# Patient Record
Sex: MALE | Race: WHITE | NOT HISPANIC OR LATINO
[De-identification: names, ages, dates, MRNs, and addresses within clinical notes are randomized per-mention and may not be internally consistent; named-entity substitution may affect disease eponyms.]

---

## 2022-06-02 ENCOUNTER — APPOINTMENT (OUTPATIENT)
Dept: VASCULAR SURGERY | Facility: CLINIC | Age: 72
End: 2022-06-02

## 2022-06-02 DIAGNOSIS — E78.5 HYPERLIPIDEMIA, UNSPECIFIED: ICD-10-CM

## 2022-06-02 DIAGNOSIS — I10 ESSENTIAL (PRIMARY) HYPERTENSION: ICD-10-CM

## 2022-06-02 DIAGNOSIS — I87.8 OTHER SPECIFIED DISORDERS OF VEINS: ICD-10-CM

## 2022-06-02 DIAGNOSIS — I83.92 ASYMPTOMATIC VARICOSE VEINS OF LEFT LOWER EXTREMITY: ICD-10-CM

## 2022-06-02 PROBLEM — Z00.00 ENCOUNTER FOR PREVENTIVE HEALTH EXAMINATION: Status: ACTIVE | Noted: 2022-06-02

## 2022-06-02 PROCEDURE — ZZZZZ: CPT

## 2022-06-03 PROBLEM — I83.92 ASYMPTOMATIC VARICOSE VEINS OF LEFT LOWER EXTREMITY: Status: ACTIVE | Noted: 2022-06-03

## 2022-06-03 PROBLEM — I87.8 VENOUS STASIS: Status: ACTIVE | Noted: 2022-06-03

## 2022-06-06 PROBLEM — E78.5 HLD (HYPERLIPIDEMIA): Status: ACTIVE | Noted: 2022-06-06

## 2022-06-06 PROBLEM — I10 HTN (HYPERTENSION): Status: ACTIVE | Noted: 2022-06-06

## 2022-06-06 RX ORDER — LEVOTHYROXINE SODIUM 0.12 MG/1
125 TABLET ORAL
Refills: 0 | Status: ACTIVE | COMMUNITY

## 2022-06-06 NOTE — PHYSICAL EXAM
[Respiratory Effort] : normal respiratory effort [Ankle Swelling (On Exam)] : present [Ankle Swelling Bilaterally] : bilaterally  [Varicose Veins Of Lower Extremities] : bilaterally [Ankle Swelling On The Left] : moderate [] : of the right leg [Ankle Swelling On The Right] : mild [No Rash or Lesion] : No rash or lesion [Alert] : alert [Oriented to Person] : oriented to person [Oriented to Place] : oriented to place [Oriented to Time] : oriented to time [Calm] : calm [de-identified] : WN/WD [FreeTextEntry1] : Right medial calf with large, multiple bulging varicose veins. Left medial calf with a couple of large varices, not as prominent. Bilateral mild ankle swelling with indentation on skin from socks R>L. R medial ankle with collection of spider veins and some skin hyperpigmentation skin changes. Pictures available and video. Pt also points out the right medial ankle skin feels dry to touch and tight. [de-identified] : FROM

## 2022-06-06 NOTE — ASSESSMENT
[Arterial/Venous Disease] : arterial/venous disease [FreeTextEntry1] : 72 y/o M w/ RLE GSV reflux and large bulging varices with early signs of venous stasis and asymptomatic spider veins. On exam, right medial calf with large, multiple bulging varicose veins. Left medial calf with a couple of large varices, not as prominent. Bilateral mild ankle swelling with indentation on skin from socks R>L. R medial ankle with collection of spider veins and some skin hyperpigmentation skin changes. Reviewed RLE venous duplex from 10/29/21: all veins compressible. No DVT or SVT. GSV reflux positively identified from SPJ to calf. VV measuring up to 4.9mm. I recommend ablation of the incompetent greater saphenous vein. This will greatly alleviate the symptoms on the right side which are the result of venous hypertension. He might need subsequent stab phlebectomy if the varices persist but most often this i s not necessary.  He wants to proceed and we will arrange for it to be done in our office.\par

## 2022-06-06 NOTE — DATA REVIEWED
[FreeTextEntry1] : RLE venous duplex 10/29/21: all veins compressible. No DVT or SVT. GSV reflux positively identified from SPJ to calf. VV measuring up to 4.9mm.

## 2022-06-06 NOTE — ADDENDUM
[FreeTextEntry1] : I, Dr. Zachary Silva, personally performed the evaluation and management (E/M) services for this new patient.  That E/M includes conducting the initial examination, assessing all conditions, and establishing the plan of care.  Today, my ACP, Ashley Gupta NP, was here to observe my evaluation and management services for this patient to be followed going forward.

## 2022-06-06 NOTE — HISTORY OF PRESENT ILLNESS
[FreeTextEntry1] : Verbal consent for telephonic services was given on June 2nd, 2022 by the patient, Juanito Perea. Visit provided using real-time TELEHEALTH services with two-way video and audio platform. \par \par Patient Location: \par - Home\par \par Provider Location: \par - [facility] - Vascular Surgery [address], [address] [facility] [address]\par \par Participants: \par - Dr Zachary Silva\par - NP Ashley Gupta\par - Patient\par \par I have spent 30 minutes with the patient on the telephone.\par \par \par 70 y/o M w/ hx of venous reflux and varicose veins. He was an old pt of mine many years ago. He reports the right leg venous reflux has been present since 1990s as well as the varices. These have gotten larger and more prominent over the years. 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 He decided to proceed with any suggested intervention for the leg/veins now as the leg feels tired and heavy; it has been limiting his activity status. He explains there is no pain however the veins and leg feels uncomfortable. The veins bulge more as the day progresses along with the symptoms. He has occasional itching over the varices. Denies any personal or family hx of DVT/SVT/PE. No wound/ulcers, claudication symptoms, bleeding varicose veins, previous vein procedures. Strong family history of varicose veins from his mother's side of the family. He has not symptoms on the left leg.\par \par FHx:\par Mother- varicose veins\par Father - heart disease\par \par

## 2022-09-21 ENCOUNTER — NON-APPOINTMENT (OUTPATIENT)
Age: 72
End: 2022-09-21

## 2022-09-21 ENCOUNTER — APPOINTMENT (OUTPATIENT)
Dept: VASCULAR SURGERY | Facility: CLINIC | Age: 72
End: 2022-09-21

## 2022-09-21 DIAGNOSIS — M25.473 EFFUSION, UNSPECIFIED ANKLE: ICD-10-CM

## 2022-09-21 DIAGNOSIS — I83.891 VARICOSE VEINS OF RIGHT LOWER EXTREMITY WITH OTHER COMPLICATIONS: ICD-10-CM

## 2022-09-21 DIAGNOSIS — I87.2 VENOUS INSUFFICIENCY (CHRONIC) (PERIPHERAL): ICD-10-CM

## 2022-09-21 PROCEDURE — 36475 ENDOVENOUS RF 1ST VEIN: CPT | Mod: RT

## 2022-09-22 NOTE — ADDENDUM
[FreeTextEntry1] : This note was written by Ashley Gupta NP acting as scribe for Dr.Gary Ricardo MURPHY.\par \par I, Dr. Zachary Silva  have read and attest that all the information, medical decision making and discharge instructions within are true and accurate.

## 2022-09-22 NOTE — PROCEDURE
[FreeTextEntry3] : Surgeon: Zachary Silva MD\par \par Assistant: Ashley Tiwari RVT\par \par Pre-Op Diagnosis:  Incompetent RIGHT Greater Saphenous Vein\par \par Post-Op Diagnosis:  Same\par \par Procedure:  Transcatheter Occlusion of RIGHT Greater Saphenous Vein using a Radio- Frequency Thermal Ablation (VNUS) ClosureFAST Catheter.\par \par Anesthesia: Local \par \par History: Symptomatic varicose veins\par \par Findings:  Complete occlusion of greater saphenous vein at end of procedure.\par \par Procedure: The patient’s leg was prepped and draped.  Under local 1% Lidocaine the greater saphenous vein was punctured below the knee with a micropuncture needle and then the guidewire was inserted into the vein.  This was performed under ultrasound guidance.  The skin was incised with a #11 Blade, a dilator was inserted and then the 7 Fr. Introducer was placed into the greater saphenous vein.  \par \par The fossa ovalis was visualized with the Duplex scan.  The common femoral vein was confirmed to be patent.  Duplex examination of the greater saphenous vein from the calf to the groin was performed.  The greater saphenous and inferior epigastric vein were identified and the VNUS catheter was introduced through the introducer and into the vein up to the fossa ovalis.  It was positioned 3 cm distal to the inferior epigastric vein.\par \par Tumescent solution (400 cc normal saline, 4cc of 8.4% Sodium bicarbonate and 40 cc 1% Lidoaine) was infiltrated subfascially over the vein.  \par \par The VNUS ClosureFAST catheter checked for proper function.  Thermal ablation was begun after the position of the catheter was once again confirmed to be 3 cm distal to the inferior epigastric branch of the greater saphenous vein. The proximal 7 cm was treated twice and then the rest of the vein was treated with single 20 sec cycles. The sheath and catheter were removed. Compression was applied for hemostasis.    \par \par Confirmation of common femoral vein patency and occlusion of the greater saphenous vein was made with duplex ultrasonography.  \par \par The leg was wrapped with gauze and Ace Bandages.  The patient remained on the table until alert and was then comfortable ambulating.           \par \par Patient will return in 2-3 days for repeat ultrasound to make sure GSV is closed and to check for heat induced thrombus.

## 2022-09-23 ENCOUNTER — APPOINTMENT (OUTPATIENT)
Dept: VASCULAR SURGERY | Facility: CLINIC | Age: 72
End: 2022-09-23

## 2022-09-23 PROCEDURE — 93971 EXTREMITY STUDY: CPT

## 2023-04-26 ENCOUNTER — APPOINTMENT (OUTPATIENT)
Dept: VASCULAR SURGERY | Facility: CLINIC | Age: 73
End: 2023-04-26
Payer: MEDICARE

## 2023-04-26 DIAGNOSIS — I83.93 ASYMPTOMATIC VARICOSE VEINS OF BILATERAL LOWER EXTREMITIES: ICD-10-CM

## 2023-04-26 PROCEDURE — 93970 EXTREMITY STUDY: CPT

## 2023-04-26 PROCEDURE — 99213 OFFICE O/P EST LOW 20 MIN: CPT

## 2023-04-27 NOTE — ADDENDUM
[FreeTextEntry1] : I, Dr. Zcahary Silva, personally performed the evaluation and management (E/M) services for this established patient who presents today with (a) new problem(s)/exacerbation of (an) existing condition(s).  That E/M includes conducting the examination, assessing all new/exacerbated conditions, and establishing a new plan of care.  Today, my ACP, Ashley Gupta NP, was here to observe my evaluation and management services for this new problem/exacerbated condition to be followed going forward. \par \par The documentation for this encounter was entered by Elsy Ortega acting as a scribe for Dr.Gary Silva.\par

## 2023-04-27 NOTE — HISTORY OF PRESENT ILLNESS
[FreeTextEntry1] : 71 y/o M w/ hx of venous reflux and symptomatic varicose veins. 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 \par \par He reports improvement of veins since procedure however, he still points out few bulging veins over the knee. He also notes discoloration that developed from the L ankle to calf a few weeks ago. Denies any personal or family hx of DVT/SVT/PE. No wound/ulcers, claudication symptoms, bleeding varicose veins. Strong family history of varicose veins from his mother's side of the family. He also developed new symptoms of swelling in the left leg. He wears compression stockings 15-20 mmHg and notes some improvement. Patient was also started on Amlodipine for BP.\par \par FHx:\par Mother- varicose veins\par Father - heart disease\par \par

## 2023-04-27 NOTE — PROCEDURE
[FreeTextEntry1] : LE Venous US ordered today to venous insufficiency reveals: negative DVT/SVT b/l. RT GSV closed, GSV calf reflux only. LLE GSV reflux in calf, SSV reflux isolated. vv bilaterally

## 2023-04-27 NOTE — ASSESSMENT
[Arterial/Venous Disease] : arterial/venous disease [FreeTextEntry1] : 71 y/o M w/ venous reflux s/p RLE RFA 9/21/22, large bulging varices on both LEs, early signs of venous stasis and asymptomatic spider veins. On exam,  Right medial calf with large, multiple bulging varicose veins. Left medial calf with a couple of large varices. Bilateral mild ankle swelling with indentation on skin from socks. R medial ankle with collection of spider veins and some skin hyperpigmentation skin changes.\par LE Venous duplex reveals negative DVT/SVT b/l. RT GSV closed, GSV calf reflux only. LLE GSV reflux in calf, SSV reflux isolated,. VV bilaterally.\par Reviewed findings with patient and reassured him no deep vein incompetency shown on US. Patient explained he may benefit from low dosage diuretic for swelling of the leg. He should continue to stay active, wear compression stockings daily and keep the skin well moisturized. F/u prn.

## 2023-08-23 ENCOUNTER — APPOINTMENT (OUTPATIENT)
Dept: VASCULAR SURGERY | Facility: CLINIC | Age: 73
End: 2023-08-23
Payer: MEDICARE

## 2023-08-23 VITALS — HEART RATE: 74 BPM | DIASTOLIC BLOOD PRESSURE: 81 MMHG | SYSTOLIC BLOOD PRESSURE: 146 MMHG

## 2023-08-23 PROCEDURE — 93880 EXTRACRANIAL BILAT STUDY: CPT

## 2023-08-23 PROCEDURE — 99213 OFFICE O/P EST LOW 20 MIN: CPT

## 2023-08-24 ENCOUNTER — OUTPATIENT (OUTPATIENT)
Dept: OUTPATIENT SERVICES | Facility: HOSPITAL | Age: 73
LOS: 1 days | End: 2023-08-24

## 2023-08-24 ENCOUNTER — APPOINTMENT (OUTPATIENT)
Dept: CT IMAGING | Facility: CLINIC | Age: 73
End: 2023-08-24
Payer: MEDICARE

## 2023-08-24 PROCEDURE — 70498 CT ANGIOGRAPHY NECK: CPT | Mod: 26

## 2023-08-25 NOTE — CONSULT LETTER
[Dear  ___] : Dear  [unfilled], [FreeTextEntry2] : [address] [place] [address] [FreeTextEntry1] : I saw Mr Juanito Perea in my office today for evaluation of carotid stenosis. 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 He reports recent findings of carotid stenosis on a duplex. Denies any neurological symptoms. He is on a daily aspirin and statin.  Repeat carotid ultrasound was done today in my office showed the right side has greater than 70% stenosis. The left side has less than 50% stenosis. Blood pressure is 146/81, heart rate 74 b/min. No carotid bruits on exam or focal neurological deficits.   The study was somewhat limited due to the amount and type of plaque on the right side. I have ordered a CT angiogram of the neck to further evaluate the stenosis. He should continue taking statin medication along with a daily aspirin. I will reach out to him with the results of the CT scan.   Please see my full EMR note below. [FreeTextEntry3] : Sincerely,   Zachary Silva M.D.  , Surgical Services [facility] Surgeon-in-Chief, [facility] Professor of Surgery, [employer] at [facility]

## 2023-08-25 NOTE — ADDENDUM
[FreeTextEntry1] : I, Dr. Zachary Silva, personally performed the evaluation and management (E/M) services for this established patient who presents today with (a) new problem(s)/exacerbation of (an) existing condition(s).  That E/M includes conducting the examination, assessing all new/exacerbated conditions, and establishing a new plan of care.  Today, my ACP, Ashley Gupta NP, was here to observe my evaluation and management services for this new problem/exacerbated condition to be followed going forward.   The documentation for this encounter was entered by Adelaida Hobbs acting as a scribe for Dr. Zachary Silva.

## 2023-08-25 NOTE — ASSESSMENT
[Arterial/Venous Disease] : arterial/venous disease [Medication Management] : medication management [FreeTextEntry1] : 73 y/o M w/ hx of venous reflux s/p RLE RFA 9/21/22, large bulging varices on both LEs, early signs of venous stasis and asymptomatic spider veins. Now presenting for eval of carotid stenosis, asymptomatic. (R >70%< L <50%).   On exam, no focal neurological deficits. No carotid bruits. /81 HR 74. Strong radial pulses bilaterally.  Carotid duplex showed Carotid duplex ordered to eval stenosis, shows: R ICA shadowing > stenosis, R ICA> 70%: Limited visibility. L ICA < 50%,  Pt to continue daily baby aspirin and statin. Neck CT angiogram ordered to further evaluate the degree of stenosis. After CT scan, will call pt with results and will let him know next steps to follow.

## 2023-08-25 NOTE — PROCEDURE
[FreeTextEntry1] : Carotid duplex ordered to eval stenosis, shows: R ICA >70% stenosis, fibrocalcific plaque with shadowing, limited study; L ICA < 50%

## 2023-08-25 NOTE — PHYSICAL EXAM
[Respiratory Effort] : normal respiratory effort [2+] : left 2+ [No Rash or Lesion] : No rash or lesion [Alert] : alert [Oriented to Person] : oriented to person [Oriented to Place] : oriented to place [Oriented to Time] : oriented to time [Calm] : calm [Varicose Veins Of Lower Extremities] : present [Varicose Veins Of The Left Leg] : of the left leg [Ankle Swelling On The Right] : mild [Right Carotid Bruit] : no bruit heard over the right carotid [Left Carotid Bruit] : no bruit heard over the left carotid [de-identified] : WN/WD [de-identified] : FROM [FreeTextEntry1] : No focal neuro deficits.

## 2023-08-25 NOTE — HISTORY OF PRESENT ILLNESS
[FreeTextEntry1] : 71 y/o M w/ PMHx of HTN, HLD, who has been seen here in the past for venous reflux and symptomatic varicose veins. 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 Today, he presents for evaluation of carotid stenosis. He is active, cycles regularly. Pt reports palpitation after his dose of levothyroxine was decreased. He presented to the ER, did lab tests and imaging studies and was informed he does not have arrhythmia. He currently feels normal. Denies trouble with walking. Denies any neurological symptoms. He is on a daily aspirin and statin.       FHx: -Mother- hx of varicose veins -Father - hx of heart disease -Grandfather- hx of CVA  SHx: -Never smoker

## 2023-09-07 DIAGNOSIS — Z01.818 ENCOUNTER FOR OTHER PREPROCEDURAL EXAMINATION: ICD-10-CM

## 2023-09-07 RX ORDER — CHROMIUM 200 MCG
TABLET ORAL
Refills: 0 | Status: ACTIVE | COMMUNITY

## 2023-09-14 ENCOUNTER — RESULT REVIEW (OUTPATIENT)
Age: 73
End: 2023-09-14

## 2023-09-14 ENCOUNTER — OUTPATIENT (OUTPATIENT)
Dept: OUTPATIENT SERVICES | Facility: HOSPITAL | Age: 73
LOS: 1 days | End: 2023-09-14
Payer: MEDICARE

## 2023-09-14 DIAGNOSIS — Z01.818 ENCOUNTER FOR OTHER PREPROCEDURAL EXAMINATION: ICD-10-CM

## 2023-09-14 DIAGNOSIS — I10 ESSENTIAL (PRIMARY) HYPERTENSION: ICD-10-CM

## 2023-09-14 DIAGNOSIS — I65.23 OCCLUSION AND STENOSIS OF BILATERAL CAROTID ARTERIES: ICD-10-CM

## 2023-09-14 PROCEDURE — 93018 CV STRESS TEST I&R ONLY: CPT

## 2023-09-14 PROCEDURE — 78452 HT MUSCLE IMAGE SPECT MULT: CPT | Mod: 26

## 2023-09-14 PROCEDURE — A9500: CPT

## 2023-09-14 PROCEDURE — 78452 HT MUSCLE IMAGE SPECT MULT: CPT

## 2023-09-14 PROCEDURE — 93017 CV STRESS TEST TRACING ONLY: CPT

## 2023-09-14 PROCEDURE — 93016 CV STRESS TEST SUPVJ ONLY: CPT

## 2023-09-15 VITALS
HEIGHT: 69 IN | WEIGHT: 212.31 LBS | DIASTOLIC BLOOD PRESSURE: 69 MMHG | TEMPERATURE: 97 F | SYSTOLIC BLOOD PRESSURE: 124 MMHG | OXYGEN SATURATION: 95 % | RESPIRATION RATE: 16 BRPM | HEART RATE: 65 BPM

## 2023-09-17 ENCOUNTER — TRANSCRIPTION ENCOUNTER (OUTPATIENT)
Age: 73
End: 2023-09-17

## 2023-09-18 ENCOUNTER — APPOINTMENT (OUTPATIENT)
Dept: VASCULAR SURGERY | Facility: HOSPITAL | Age: 73
End: 2023-09-18

## 2023-09-18 ENCOUNTER — RESULT REVIEW (OUTPATIENT)
Age: 73
End: 2023-09-18

## 2023-09-18 ENCOUNTER — INPATIENT (INPATIENT)
Facility: HOSPITAL | Age: 73
LOS: 0 days | Discharge: HOME | DRG: 39 | End: 2023-09-19
Attending: SURGERY | Admitting: SURGERY
Payer: MEDICARE

## 2023-09-18 ENCOUNTER — TRANSCRIPTION ENCOUNTER (OUTPATIENT)
Age: 73
End: 2023-09-18

## 2023-09-18 DIAGNOSIS — Z90.89 ACQUIRED ABSENCE OF OTHER ORGANS: Chronic | ICD-10-CM

## 2023-09-18 DIAGNOSIS — Z98.890 OTHER SPECIFIED POSTPROCEDURAL STATES: Chronic | ICD-10-CM

## 2023-09-18 LAB
ANION GAP SERPL CALC-SCNC: 7 MMOL/L — SIGNIFICANT CHANGE UP (ref 5–17)
APTT BLD: 42.5 SEC — HIGH (ref 24.5–35.6)
BUN SERPL-MCNC: 12 MG/DL — SIGNIFICANT CHANGE UP (ref 7–23)
CALCIUM SERPL-MCNC: 8.5 MG/DL — SIGNIFICANT CHANGE UP (ref 8.4–10.5)
CHLORIDE SERPL-SCNC: 108 MMOL/L — SIGNIFICANT CHANGE UP (ref 96–108)
CO2 SERPL-SCNC: 23 MMOL/L — SIGNIFICANT CHANGE UP (ref 22–31)
CREAT SERPL-MCNC: 0.67 MG/DL — SIGNIFICANT CHANGE UP (ref 0.5–1.3)
EGFR: 99 ML/MIN/1.73M2 — SIGNIFICANT CHANGE UP
GLUCOSE BLDC GLUCOMTR-MCNC: 111 MG/DL — HIGH (ref 70–99)
GLUCOSE BLDC GLUCOMTR-MCNC: 134 MG/DL — HIGH (ref 70–99)
GLUCOSE SERPL-MCNC: 90 MG/DL — SIGNIFICANT CHANGE UP (ref 70–99)
HCT VFR BLD CALC: 39.7 % — SIGNIFICANT CHANGE UP (ref 39–50)
HGB BLD-MCNC: 13.7 G/DL — SIGNIFICANT CHANGE UP (ref 13–17)
INR BLD: 1.27 — HIGH (ref 0.85–1.18)
ISTAT ACTK (ACTIVATED CLOTTING TIME KAOLIN): 191 SEC — HIGH (ref 74–137)
MAGNESIUM SERPL-MCNC: 1.9 MG/DL — SIGNIFICANT CHANGE UP (ref 1.6–2.6)
MCHC RBC-ENTMCNC: 31.4 PG — SIGNIFICANT CHANGE UP (ref 27–34)
MCHC RBC-ENTMCNC: 34.5 GM/DL — SIGNIFICANT CHANGE UP (ref 32–36)
MCV RBC AUTO: 91.1 FL — SIGNIFICANT CHANGE UP (ref 80–100)
NRBC # BLD: 0 /100 WBCS — SIGNIFICANT CHANGE UP (ref 0–0)
PHOSPHATE SERPL-MCNC: 3.4 MG/DL — SIGNIFICANT CHANGE UP (ref 2.5–4.5)
PLATELET # BLD AUTO: 201 K/UL — SIGNIFICANT CHANGE UP (ref 150–400)
POTASSIUM SERPL-MCNC: 4 MMOL/L — SIGNIFICANT CHANGE UP (ref 3.5–5.3)
POTASSIUM SERPL-SCNC: 4 MMOL/L — SIGNIFICANT CHANGE UP (ref 3.5–5.3)
PROTHROM AB SERPL-ACNC: 14.4 SEC — HIGH (ref 9.5–13)
RBC # BLD: 4.36 M/UL — SIGNIFICANT CHANGE UP (ref 4.2–5.8)
RBC # FLD: 12.4 % — SIGNIFICANT CHANGE UP (ref 10.3–14.5)
SODIUM SERPL-SCNC: 138 MMOL/L — SIGNIFICANT CHANGE UP (ref 135–145)
WBC # BLD: 7.34 K/UL — SIGNIFICANT CHANGE UP (ref 3.8–10.5)
WBC # FLD AUTO: 7.34 K/UL — SIGNIFICANT CHANGE UP (ref 3.8–10.5)

## 2023-09-18 PROCEDURE — 88304 TISSUE EXAM BY PATHOLOGIST: CPT | Mod: 26

## 2023-09-18 PROCEDURE — 88311 DECALCIFY TISSUE: CPT | Mod: 26

## 2023-09-18 PROCEDURE — 99223 1ST HOSP IP/OBS HIGH 75: CPT | Mod: GC

## 2023-09-18 PROCEDURE — 35301 RECHANNELING OF ARTERY: CPT | Mod: GC

## 2023-09-18 DEVICE — SURGICEL FIBRILLAR 4 X 4": Type: IMPLANTABLE DEVICE | Site: RIGHT | Status: FUNCTIONAL

## 2023-09-18 DEVICE — ARISTA 3GR: Type: IMPLANTABLE DEVICE | Site: RIGHT | Status: FUNCTIONAL

## 2023-09-18 DEVICE — PATCH PERICARDIAL PHOTOFIX .8X8CM: Type: IMPLANTABLE DEVICE | Site: RIGHT | Status: FUNCTIONAL

## 2023-09-18 DEVICE — LIGATING CLIPS WECK HORIZON SMALL-WIDE (RED) 24: Type: IMPLANTABLE DEVICE | Site: RIGHT | Status: FUNCTIONAL

## 2023-09-18 DEVICE — LIGATING CLIPS WECK HORIZON SMALL (YELLOW) 24: Type: IMPLANTABLE DEVICE | Site: RIGHT | Status: FUNCTIONAL

## 2023-09-18 DEVICE — LIGATING CLIPS WECK HORIZON MEDIUM (BLUE) 24: Type: IMPLANTABLE DEVICE | Site: RIGHT | Status: FUNCTIONAL

## 2023-09-18 RX ORDER — ASPIRIN/CALCIUM CARB/MAGNESIUM 324 MG
81 TABLET ORAL DAILY
Refills: 0 | Status: DISCONTINUED | OUTPATIENT
Start: 2023-09-19 | End: 2023-09-19

## 2023-09-18 RX ORDER — GLUCAGON INJECTION, SOLUTION 0.5 MG/.1ML
1 INJECTION, SOLUTION SUBCUTANEOUS ONCE
Refills: 0 | Status: DISCONTINUED | OUTPATIENT
Start: 2023-09-18 | End: 2023-09-19

## 2023-09-18 RX ORDER — SODIUM CHLORIDE 9 MG/ML
250 INJECTION, SOLUTION INTRAVENOUS ONCE
Refills: 0 | Status: COMPLETED | OUTPATIENT
Start: 2023-09-18 | End: 2023-09-18

## 2023-09-18 RX ORDER — DEXTROSE 50 % IN WATER 50 %
25 SYRINGE (ML) INTRAVENOUS ONCE
Refills: 0 | Status: DISCONTINUED | OUTPATIENT
Start: 2023-09-18 | End: 2023-09-19

## 2023-09-18 RX ORDER — INSULIN LISPRO 100/ML
VIAL (ML) SUBCUTANEOUS EVERY 6 HOURS
Refills: 0 | Status: DISCONTINUED | OUTPATIENT
Start: 2023-09-18 | End: 2023-09-19

## 2023-09-18 RX ORDER — LEVOTHYROXINE SODIUM 125 MCG
1 TABLET ORAL
Refills: 0 | DISCHARGE

## 2023-09-18 RX ORDER — DEXTROSE 50 % IN WATER 50 %
15 SYRINGE (ML) INTRAVENOUS ONCE
Refills: 0 | Status: DISCONTINUED | OUTPATIENT
Start: 2023-09-18 | End: 2023-09-19

## 2023-09-18 RX ORDER — LEVOTHYROXINE SODIUM 125 MCG
125 TABLET ORAL DAILY
Refills: 0 | Status: DISCONTINUED | OUTPATIENT
Start: 2023-09-19 | End: 2023-09-19

## 2023-09-18 RX ORDER — CEFAZOLIN SODIUM 1 G
2000 VIAL (EA) INJECTION EVERY 8 HOURS
Refills: 0 | Status: COMPLETED | OUTPATIENT
Start: 2023-09-18 | End: 2023-09-18

## 2023-09-18 RX ORDER — ATORVASTATIN CALCIUM 80 MG/1
1 TABLET, FILM COATED ORAL
Refills: 0 | DISCHARGE

## 2023-09-18 RX ORDER — INFLUENZA VIRUS VACCINE 15; 15; 15; 15 UG/.5ML; UG/.5ML; UG/.5ML; UG/.5ML
0.7 SUSPENSION INTRAMUSCULAR ONCE
Refills: 0 | Status: DISCONTINUED | OUTPATIENT
Start: 2023-09-18 | End: 2023-09-19

## 2023-09-18 RX ORDER — ASPIRIN/CALCIUM CARB/MAGNESIUM 324 MG
1 TABLET ORAL
Refills: 0 | DISCHARGE

## 2023-09-18 RX ORDER — SODIUM CHLORIDE 9 MG/ML
1000 INJECTION, SOLUTION INTRAVENOUS
Refills: 0 | Status: DISCONTINUED | OUTPATIENT
Start: 2023-09-18 | End: 2023-09-19

## 2023-09-18 RX ORDER — ATORVASTATIN CALCIUM 80 MG/1
40 TABLET, FILM COATED ORAL AT BEDTIME
Refills: 0 | Status: DISCONTINUED | OUTPATIENT
Start: 2023-09-18 | End: 2023-09-19

## 2023-09-18 RX ORDER — ACETAMINOPHEN 500 MG
1000 TABLET ORAL ONCE
Refills: 0 | Status: COMPLETED | OUTPATIENT
Start: 2023-09-18 | End: 2023-09-18

## 2023-09-18 RX ORDER — CHLORHEXIDINE GLUCONATE 213 G/1000ML
1 SOLUTION TOPICAL
Refills: 0 | Status: DISCONTINUED | OUTPATIENT
Start: 2023-09-18 | End: 2023-09-19

## 2023-09-18 RX ORDER — SODIUM CHLORIDE 9 MG/ML
1000 INJECTION INTRAMUSCULAR; INTRAVENOUS; SUBCUTANEOUS
Refills: 0 | Status: DISCONTINUED | OUTPATIENT
Start: 2023-09-18 | End: 2023-09-19

## 2023-09-18 RX ORDER — INSULIN LISPRO 100/ML
VIAL (ML) SUBCUTANEOUS AT BEDTIME
Refills: 0 | Status: DISCONTINUED | OUTPATIENT
Start: 2023-09-18 | End: 2023-09-18

## 2023-09-18 RX ORDER — NOREPINEPHRINE BITARTRATE/D5W 8 MG/250ML
0.05 PLASTIC BAG, INJECTION (ML) INTRAVENOUS
Qty: 8 | Refills: 0 | Status: DISCONTINUED | OUTPATIENT
Start: 2023-09-18 | End: 2023-09-19

## 2023-09-18 RX ORDER — DEXTROSE 50 % IN WATER 50 %
12.5 SYRINGE (ML) INTRAVENOUS ONCE
Refills: 0 | Status: DISCONTINUED | OUTPATIENT
Start: 2023-09-18 | End: 2023-09-19

## 2023-09-18 RX ADMIN — CHLORHEXIDINE GLUCONATE 1 APPLICATION(S): 213 SOLUTION TOPICAL at 22:12

## 2023-09-18 RX ADMIN — Medication 1000 MILLIGRAM(S): at 20:05

## 2023-09-18 RX ADMIN — Medication 100 MILLIGRAM(S): at 23:43

## 2023-09-18 RX ADMIN — Medication 100 MILLIGRAM(S): at 16:15

## 2023-09-18 RX ADMIN — ATORVASTATIN CALCIUM 40 MILLIGRAM(S): 80 TABLET, FILM COATED ORAL at 22:09

## 2023-09-18 RX ADMIN — Medication 9.03 MICROGRAM(S)/KG/MIN: at 13:04

## 2023-09-18 RX ADMIN — SODIUM CHLORIDE 100 MILLILITER(S): 9 INJECTION INTRAMUSCULAR; INTRAVENOUS; SUBCUTANEOUS at 13:04

## 2023-09-18 RX ADMIN — Medication 400 MILLIGRAM(S): at 19:50

## 2023-09-18 RX ADMIN — SODIUM CHLORIDE 1000 MILLILITER(S): 9 INJECTION, SOLUTION INTRAVENOUS at 12:24

## 2023-09-18 NOTE — PROVIDER CONTACT NOTE (OTHER) - SITUATION
PT s/ CEA, levophed started for SBP goal 110-160. Pts HR went as low as 37, pt has been 40-55 since admitted to floor.

## 2023-09-18 NOTE — BRIEF OPERATIVE NOTE - NSICDXBRIEFPREOP_GEN_ALL_CORE_FT
PRE-OP DIAGNOSIS:  Carotid artery stenosis, unilateral, right 18-Sep-2023 06:59:37  Jazmine Villatoro

## 2023-09-18 NOTE — BRIEF OPERATIVE NOTE - OPERATION/FINDINGS
Procedure: R CEA  Indication: Asymptomatic Carotid Artery Stenosis  Description: R CEA performed in usual fashion with shunting and patch angioplasty. Hypoglossal and vagus nerves identified and protected. Patient heparinized. Hemostasis achieved and incision closed in layers. Patient awoke with no neurological deficits from anesthesia.  IVF:  EBL: Procedure: R CEA  Indication: Asymptomatic Carotid Artery Stenosis  Description: R CEA performed in usual fashion with patch angioplasty under local and cervical block. Hypoglossal and vagus nerves identified and protected. Patient heparinized. Hemostasis achieved and incision closed in layers. Patient awoke with no neurological deficits from anesthesia.  IVF: 700  EBL: 50  EBL:

## 2023-09-18 NOTE — H&P ADULT - ASSESSMENT
73 y/o M w/ hx HTN, HLD, of venous reflux s/p RLE RFA 9/21/22, large bulging varices on both LEs, early signs of venous stasis and asymptomatic spider veins. Now presenting for eval of carotid stenosis, asymptomatic. (R >70%< L <50%). He has had no focal neurological deficits. Carotid duplex ordered to eval stenosis, shows: R ICA shadowing > stenosis, R ICA> 70% (219/56): Limited visibility. L ICA < 50%, CT showed severe stenosis of proximal R ICA, Moderate stenosis of the right vertebral, and mild stenosis of L ICA. Patient now s/p R CEA    #SHAUN  - s/p CEA  - neurochecks q15 for 1 hr, q30 for 2 hr, q1hr for 5 hrs  - monitor for headaches or neck hematoma  - ancef x 2 doses  - SBP goals 110-160  - NPO for 4 hours then CLD  - IVF @ 75cc  - TOV  - c/w ASA/Statin  - STAT labs    #HTN  - monitor BP, c/w amlodipine    DVT ppx: patient heparinized in OR  Dispo: SICU 71 y/o M w/ hx HTN, hypothyroid, HLD, of venous reflux s/p RLE RFA 9/21/22, large bulging varices on both LEs, early signs of venous stasis and asymptomatic spider veins. Now presenting for eval of carotid stenosis, asymptomatic. (R >70%< L <50%). He has had no focal neurological deficits. Carotid duplex ordered to eval stenosis, shows: R ICA shadowing > stenosis, R ICA> 70% (219/56): Limited visibility. L ICA < 50%, CT showed severe stenosis of proximal R ICA, Moderate stenosis of the right vertebral, and mild stenosis of L ICA. Patient now s/p R CEA    #SHAUN  - s/p CEA  - neurochecks q15 for 1 hr, q30 for 2 hr, q1hr for 5 hrs  - monitor for headaches or neck hematoma  - ancef x 2 doses  - SBP goals 110-160  - NPO for 4 hours then CLD  - IVF @ 75cc  - TOV  - c/w ASA/Statin  - STAT labs    #HTN  - monitor BP, c/w amlodipine    #HLD  - c/w statin    #Hypothyroid  - c/w synthroid    DVT ppx: patient heparinized in OR  Dispo: SICU

## 2023-09-18 NOTE — PROVIDER CONTACT NOTE (OTHER) - ACTION/TREATMENT ORDERED:
PA HO made aware and assessed ot at bedside. PA stated OK and to continue to monitor pt and titrate levophed gtt Down if possible. EKG done, will continue to monitor

## 2023-09-18 NOTE — PROGRESS NOTE ADULT - SUBJECTIVE AND OBJECTIVE BOX
HPI: 72yMale w/ hx of venous reflux s/p RLE RFA 9/21/22, large bulging varices on both LEs, early signs of venous stasis and asymptomatic spider veins. Now presenting for eval of carotid stenosis, asymptomatic. (R >70%< L <50%).  He is asymptomatic, has had no focal neurological deficits.Carotid duplex ordered to eval stenosis, shows: R ICA shadowing > stenosis, R ICA> 70% (219/56): Limited visibility. L ICA < 50%, CT showed severe stenosis of proximal R ICA, Moderate stenosis of the right vertebral, and mild stenosis of L ICA. Patient now presenting for R CEA    PMH:     MEDS:  Allergies    No Known Allergies    Intolerances        ICU Vital Signs Last 24 Hrs  T(F): 97.3 (09-18-23 @ 12:15), Max: 97.3 (09-18-23 @ 12:15)  HR: 46 (09-18-23 @ 15:00) (42 - 65)  BP: 113/57 (09-18-23 @ 15:00) (93/51 - 138/64)  BP(mean): 82 (09-18-23 @ 15:00) (66 - 92)  ABP: 119/43 (09-18-23 @ 15:00)  RR: 19 (09-18-23 @ 15:00) (16 - 19)  SpO2: 95% (09-18-23 @ 15:00) (85% - 99%)    PHYSICAL EXAM:   Neurological: AAOx3, CNII-XII intact,  strength 5/5 b/l  ENT: mucus membrane moist  Cardiovascular: RRR  Respiratory: CTA  Gastrointestinal: soft, NT, ND, BS+  Extremities: warm, no dependent edema  Vascular: no cyanosis/erythema  Skin: no rashes  MSK: no joint swelling.   LABS:    09-18    138  |  108  |  12  ----------------------------<  90  4.0   |  23  |  0.67    Ca    8.5      18 Sep 2023 12:07  Phos  3.4     09-18  Mg     1.9     09-18                          13.7   7.34  )-----------( 201      ( 18 Sep 2023 12:07 )             39.7   PT/INR - ( 18 Sep 2023 12:08 )   PT: 14.4 sec;   INR: 1.27          PTT - ( 18 Sep 2023 12:08 )  PTT:42.5 sec  Urinalysis Basic - ( 18 Sep 2023 12:07 )    Color: x / Appearance: x / SG: x / pH: x  Gluc: 90 mg/dL / Ketone: x  / Bili: x / Urobili: x   Blood: x / Protein: x / Nitrite: x   Leuk Esterase: x / RBC: x / WBC x   Sq Epi: x / Non Sq Epi: x / Bacteria: x    CAPILLARY BLOOD GLUCOSE        Duarte:	  [ ] None	[ ] Daily Duarte Order Placed	   Indication:	  [ ] Strict I and O's    [ ] Obstruction     [ ] Incontinence + Stage 3 or 4 Decubitus  Central Line:  [ ] None	   [ ]  Medication / TPN Administration     [ ] No Peripheral IV          HPI: 72yMale w/ hx of venous reflux s/p RLE RFA 9/21/22, large bulging varices on both LEs, early signs of venous stasis and asymptomatic spider veins. Now presenting for eval of carotid stenosis, asymptomatic. (R >70%< L <50%).  He is asymptomatic, has had no focal neurological deficits.Carotid duplex ordered to eval stenosis, shows: R ICA shadowing > stenosis, R ICA> 70% (219/56): Limited visibility. L ICA < 50%, CT showed severe stenosis of proximal R ICA, Moderate stenosis of the right vertebral, and mild stenosis of L ICA. Patient now presenting for R CEA    PMH: as above.     MEDS: norvasc, lipitor, synthroid 125qd, vit D, ASA 81   Allergies    No Known Allergies    Intolerances        ICU Vital Signs Last 24 Hrs  T(F): 97.3 (09-18-23 @ 12:15), Max: 97.3 (09-18-23 @ 12:15)  HR: 46 (09-18-23 @ 15:00) (42 - 65)  BP: 113/57 (09-18-23 @ 15:00) (93/51 - 138/64)  BP(mean): 82 (09-18-23 @ 15:00) (66 - 92)  ABP: 119/43 (09-18-23 @ 15:00)  RR: 19 (09-18-23 @ 15:00) (16 - 19)  SpO2: 95% (09-18-23 @ 15:00) (85% - 99%)    PHYSICAL EXAM:   Neurological: AAOx3, CNII-XII intact,  strength 5/5 b/l  ENT: mucus membrane moist  Cardiovascular: RRR  Respiratory: CTA  Gastrointestinal: soft, NT, ND, BS+  Extremities: warm, no dependent edema  Vascular: no cyanosis/erythema  Skin: no rashes  MSK: no joint swelling.   LABS:    09-18    138  |  108  |  12  ----------------------------<  90  4.0   |  23  |  0.67    Ca    8.5      18 Sep 2023 12:07  Phos  3.4     09-18  Mg     1.9     09-18                          13.7   7.34  )-----------( 201      ( 18 Sep 2023 12:07 )             39.7   PT/INR - ( 18 Sep 2023 12:08 )   PT: 14.4 sec;   INR: 1.27          PTT - ( 18 Sep 2023 12:08 )  PTT:42.5 sec  Urinalysis Basic - ( 18 Sep 2023 12:07 )    Color: x / Appearance: x / SG: x / pH: x  Gluc: 90 mg/dL / Ketone: x  / Bili: x / Urobili: x   Blood: x / Protein: x / Nitrite: x   Leuk Esterase: x / RBC: x / WBC x   Sq Epi: x / Non Sq Epi: x / Bacteria: x    CAPILLARY BLOOD GLUCOSE        Duarte:	  [ ] None	[ ] Daily Duarte Order Placed	   Indication:	  [ ] Strict I and O's    [ ] Obstruction     [ ] Incontinence + Stage 3 or 4 Decubitus  Central Line:  [ ] None	   [ ]  Medication / TPN Administration     [ ] No Peripheral IV          HPI: 72yMale w/  HTN, HL, hypothyroid, hx of venous reflux s/p RLE RFA 9/21/22, large bulging varices on both LEs, early signs of venous stasis and asymptomatic spider veins. Now presenting for eval of carotid stenosis, asymptomatic. (R >70%< L <50%).  He is asymptomatic, has had no focal neurological deficits.Carotid duplex ordered to eval stenosis, shows: R ICA shadowing > stenosis, R ICA> 70% (219/56): Limited visibility. L ICA < 50%, CT showed severe stenosis of proximal R ICA, Moderate stenosis of the right vertebral, and mild stenosis of L ICA. Patient now presenting for R CEA.   pt transferred to SICU post-op uncomplicated right CEA, EBL 50, got Cryst 700. SBP 90's on arrival to SICU, pt asymptomatic, denies cp/sob/palp, denies headaches, dizziness, weakness. no med c/o.     PMH: as above.     MEDS: norvasc, lipitor, synthroid 125qd, vit D, ASA 81   Allergies    No Known Allergies    Intolerances        ICU Vital Signs Last 24 Hrs  T(F): 97.3 (09-18-23 @ 12:15), Max: 97.3 (09-18-23 @ 12:15)  HR: 46 (09-18-23 @ 15:00) (42 - 65)  BP: 113/57 (09-18-23 @ 15:00) (93/51 - 138/64)  BP(mean): 82 (09-18-23 @ 15:00) (66 - 92)  ABP: 119/43 (09-18-23 @ 15:00)  RR: 19 (09-18-23 @ 15:00) (16 - 19)  SpO2: 95% (09-18-23 @ 15:00) (85% - 99%)    PHYSICAL EXAM:   Neurological: AAOx3, CNII-XII intact,  strength 5/5 b/l,   ENT: mucus membrane moist, right neck dsg clean, no bleeding.   Cardiovascular: RRR  Respiratory: CTA  Gastrointestinal: soft, NT, ND, BS+  Extremities: warm, no dependent edema  Vascular: no cyanosis/erythema  Skin: no rashes  MSK: no joint swelling.     LABS:    09-18    138  |  108  |  12  ----------------------------<  90  4.0   |  23  |  0.67    Ca    8.5      18 Sep 2023 12:07  Phos  3.4     09-18  Mg     1.9     09-18                          13.7   7.34  )-----------( 201      ( 18 Sep 2023 12:07 )             39.7   PT/INR - ( 18 Sep 2023 12:08 )   PT: 14.4 sec;   INR: 1.27          PTT - ( 18 Sep 2023 12:08 )  PTT:42.5 sec  Urinalysis Basic - ( 18 Sep 2023 12:07 )    Color: x / Appearance: x / SG: x / pH: x  Gluc: 90 mg/dL / Ketone: x  / Bili: x / Urobili: x   Blood: x / Protein: x / Nitrite: x   Leuk Esterase: x / RBC: x / WBC x   Sq Epi: x / Non Sq Epi: x / Bacteria: x    CAPILLARY BLOOD GLUCOSE        Duarte:	  [ x] None	[ ] Daily Duarte Order Placed	   Indication:	  [ ] Strict I and O's    [ ] Obstruction     [ ] Incontinence + Stage 3 or 4 Decubitus  Central Line:  [ x] None	   [ ]  Medication / TPN Administration     [ ] No Peripheral IV

## 2023-09-18 NOTE — PROGRESS NOTE ADULT - NS ATTEND AMEND GEN_ALL_CORE FT
HTN, hyperlipidemia, now s/p R CEA with hypotension postoperatively  physical as above  NE to keep SBP over 110, follow relative bradycardia  NS at 100/hr  hold norvasc

## 2023-09-18 NOTE — BRIEF OPERATIVE NOTE - NSICDXBRIEFPOSTOP_GEN_ALL_CORE_FT
POST-OP DIAGNOSIS:  Unilateral carotid artery stenosis, right 18-Sep-2023 06:59:49  Jazmine Villatoro

## 2023-09-18 NOTE — PROGRESS NOTE ADULT - ASSESSMENT
A/P: 72yMale w/ hx HTN, HL, hypothyroid,  venous reflux s/p RLE RFA 9/21/22, large bulging varices on both LEs, early signs of venous stasis and asymptomatic spider veins. found asyptomatic carotid stenosis, right>L, here for elective right CEA    NEURO:  CV:  PULM:   GI/FEN:  :  ENDO: ISS  ID:  PPX: SCDs   LINES: PIVs,   WOUNDS/DRAINS:        A/P: 72yMale w/ hx HTN, HL, hypothyroid,  venous reflux s/p RLE RFA 9/21/22, large bulging varices on both LEs, early signs of venous stasis and asymptomatic spider veins. found asyptomatic carotid stenosis, right>L, here for elective right CEA    NEURO: no focal neuro deficits, tylenol PRN, avoid narcotics. neuro checks q1h,   CV: goal -160, BP slightly lower than goal. given 250cc bolus and started on levophed. mild relative hypotension likely related to manipulation of carotid area from CEA. already took home norvasc this morning pre-op, hold additional norvasc for now given hypotension. cont ASA 81 and lipitor.   PULM: no resp distress on nasal canula.   GI/FEN: adv to CLD. NS@100.   : TOV.   ENDO: ISS, home synthroid.   ID: [email or web address] x 2 more doses post-op. (9/18)   PPX: SCDs, hold SQH today.   LINES: madeline Lang (9/18--)   WOUNDS/DRAINS: right neck incision.   Activity: bedrest today.   DIspo: likely home tomorrow if stable.

## 2023-09-18 NOTE — H&P ADULT - HISTORY OF PRESENT ILLNESS
Attending: Dr. Ricardo SON DEMOND, 72y, Male  MRN:  [id number]      HPI:    73 y/o M w/ hx of venous reflux s/p RLE RFA 9/21/22, large bulging varices on both LEs, early signs of venous stasis and asymptomatic spider veins. Now presenting for eval of carotid stenosis, asymptomatic. (R >70%< L <50%).  He has had no focal neurological deficits.Carotid duplex ordered to eval stenosis, shows: R ICA shadowing > stenosis, R ICA> 70% (219/56): Limited visibility. L ICA < 50%, CT showed severe stenosis of proximal R ICA, Moderate stenosis of the right vertebral, and mild stenosis of L ICA. Patient now presenting for R CEA          PAST MEDICAL & SURGICAL HISTORY:  HTN (hypertension)      HLD (hyperlipidemia)      H/O carotid artery stenosis      History of petit-mal seizures      History of surgery  s/p RLE RFA          Home Medications:  amLODIPine 10 mg oral tablet: 1 orally once a day (15 Sep 2023 09:40)  aspirin 81 mg oral tablet: 1 orally once a day (15 Sep 2023 09:40)  levothyroxine 112 mcg (0.112 mg) oral tablet: 1 orally once a day (15 Sep 2023 09:40)      PHYSICAL EXAM:  General: NAD, resting comfortably  Pulmonary: Nonlabored breathing, no respiratory distress  Cardiovascular: RRR  Abdominal: soft, NT, ND  Extremities: warm and well perfused     LABS:                    CAPILLARY BLOOD GLUCOSE        CAPILLARY BLOOD GLUCOSE            Microbiology:        RADIOLOGY & ADDITIONAL STUDIES:

## 2023-09-19 ENCOUNTER — TRANSCRIPTION ENCOUNTER (OUTPATIENT)
Age: 73
End: 2023-09-19

## 2023-09-19 VITALS
HEART RATE: 55 BPM | DIASTOLIC BLOOD PRESSURE: 58 MMHG | OXYGEN SATURATION: 93 % | RESPIRATION RATE: 23 BRPM | SYSTOLIC BLOOD PRESSURE: 102 MMHG

## 2023-09-19 PROBLEM — E78.5 HYPERLIPIDEMIA, UNSPECIFIED: Chronic | Status: ACTIVE | Noted: 2023-09-15

## 2023-09-19 PROBLEM — Z86.79 PERSONAL HISTORY OF OTHER DISEASES OF THE CIRCULATORY SYSTEM: Chronic | Status: ACTIVE | Noted: 2023-09-15

## 2023-09-19 PROBLEM — I10 ESSENTIAL (PRIMARY) HYPERTENSION: Chronic | Status: ACTIVE | Noted: 2023-09-15

## 2023-09-19 LAB
A1C WITH ESTIMATED AVERAGE GLUCOSE RESULT: 5.6 % — SIGNIFICANT CHANGE UP (ref 4–5.6)
ANION GAP SERPL CALC-SCNC: 7 MMOL/L — SIGNIFICANT CHANGE UP (ref 5–17)
BUN SERPL-MCNC: 10 MG/DL — SIGNIFICANT CHANGE UP (ref 7–23)
CALCIUM SERPL-MCNC: 7.6 MG/DL — LOW (ref 8.4–10.5)
CHLORIDE SERPL-SCNC: 106 MMOL/L — SIGNIFICANT CHANGE UP (ref 96–108)
CO2 SERPL-SCNC: 22 MMOL/L — SIGNIFICANT CHANGE UP (ref 22–31)
CREAT SERPL-MCNC: 0.59 MG/DL — SIGNIFICANT CHANGE UP (ref 0.5–1.3)
EGFR: 102 ML/MIN/1.73M2 — SIGNIFICANT CHANGE UP
ESTIMATED AVERAGE GLUCOSE: 114 MG/DL — SIGNIFICANT CHANGE UP (ref 68–114)
GLUCOSE BLDC GLUCOMTR-MCNC: 110 MG/DL — HIGH (ref 70–99)
GLUCOSE BLDC GLUCOMTR-MCNC: 82 MG/DL — SIGNIFICANT CHANGE UP (ref 70–99)
GLUCOSE SERPL-MCNC: 119 MG/DL — HIGH (ref 70–99)
HCT VFR BLD CALC: 39 % — SIGNIFICANT CHANGE UP (ref 39–50)
HCV AB S/CO SERPL IA: 0.04 S/CO — SIGNIFICANT CHANGE UP
HCV AB SERPL-IMP: SIGNIFICANT CHANGE UP
HGB BLD-MCNC: 13.3 G/DL — SIGNIFICANT CHANGE UP (ref 13–17)
MAGNESIUM SERPL-MCNC: 1.7 MG/DL — SIGNIFICANT CHANGE UP (ref 1.6–2.6)
MCHC RBC-ENTMCNC: 31.4 PG — SIGNIFICANT CHANGE UP (ref 27–34)
MCHC RBC-ENTMCNC: 34.1 GM/DL — SIGNIFICANT CHANGE UP (ref 32–36)
MCV RBC AUTO: 92.2 FL — SIGNIFICANT CHANGE UP (ref 80–100)
NRBC # BLD: 0 /100 WBCS — SIGNIFICANT CHANGE UP (ref 0–0)
PHOSPHATE SERPL-MCNC: 2.9 MG/DL — SIGNIFICANT CHANGE UP (ref 2.5–4.5)
PLATELET # BLD AUTO: 204 K/UL — SIGNIFICANT CHANGE UP (ref 150–400)
POTASSIUM SERPL-MCNC: 4 MMOL/L — SIGNIFICANT CHANGE UP (ref 3.5–5.3)
POTASSIUM SERPL-SCNC: 4 MMOL/L — SIGNIFICANT CHANGE UP (ref 3.5–5.3)
RBC # BLD: 4.23 M/UL — SIGNIFICANT CHANGE UP (ref 4.2–5.8)
RBC # FLD: 12.3 % — SIGNIFICANT CHANGE UP (ref 10.3–14.5)
SODIUM SERPL-SCNC: 135 MMOL/L — SIGNIFICANT CHANGE UP (ref 135–145)
WBC # BLD: 12.04 K/UL — HIGH (ref 3.8–10.5)
WBC # FLD AUTO: 12.04 K/UL — HIGH (ref 3.8–10.5)

## 2023-09-19 PROCEDURE — 86850 RBC ANTIBODY SCREEN: CPT

## 2023-09-19 PROCEDURE — C1889: CPT

## 2023-09-19 PROCEDURE — 82962 GLUCOSE BLOOD TEST: CPT

## 2023-09-19 PROCEDURE — 86900 BLOOD TYPING SEROLOGIC ABO: CPT

## 2023-09-19 PROCEDURE — 83036 HEMOGLOBIN GLYCOSYLATED A1C: CPT

## 2023-09-19 PROCEDURE — 88304 TISSUE EXAM BY PATHOLOGIST: CPT

## 2023-09-19 PROCEDURE — 85347 COAGULATION TIME ACTIVATED: CPT

## 2023-09-19 PROCEDURE — 85730 THROMBOPLASTIN TIME PARTIAL: CPT

## 2023-09-19 PROCEDURE — 85027 COMPLETE CBC AUTOMATED: CPT

## 2023-09-19 PROCEDURE — 86901 BLOOD TYPING SEROLOGIC RH(D): CPT

## 2023-09-19 PROCEDURE — 80048 BASIC METABOLIC PNL TOTAL CA: CPT

## 2023-09-19 PROCEDURE — 83735 ASSAY OF MAGNESIUM: CPT

## 2023-09-19 PROCEDURE — 36415 COLL VENOUS BLD VENIPUNCTURE: CPT

## 2023-09-19 PROCEDURE — 84100 ASSAY OF PHOSPHORUS: CPT

## 2023-09-19 PROCEDURE — 86803 HEPATITIS C AB TEST: CPT

## 2023-09-19 PROCEDURE — 88311 DECALCIFY TISSUE: CPT

## 2023-09-19 PROCEDURE — 85610 PROTHROMBIN TIME: CPT

## 2023-09-19 RX ORDER — MAGNESIUM SULFATE 500 MG/ML
2 VIAL (ML) INJECTION ONCE
Refills: 0 | Status: COMPLETED | OUTPATIENT
Start: 2023-09-19 | End: 2023-09-19

## 2023-09-19 RX ORDER — ACETAMINOPHEN 500 MG
975 TABLET ORAL ONCE
Refills: 0 | Status: COMPLETED | OUTPATIENT
Start: 2023-09-19 | End: 2023-09-19

## 2023-09-19 RX ORDER — AMLODIPINE BESYLATE 2.5 MG/1
1 TABLET ORAL
Qty: 0 | Refills: 0 | DISCHARGE

## 2023-09-19 RX ADMIN — Medication 975 MILLIGRAM(S): at 10:43

## 2023-09-19 RX ADMIN — Medication 25 GRAM(S): at 07:54

## 2023-09-19 RX ADMIN — CHLORHEXIDINE GLUCONATE 1 APPLICATION(S): 213 SOLUTION TOPICAL at 05:55

## 2023-09-19 RX ADMIN — Medication 975 MILLIGRAM(S): at 11:46

## 2023-09-19 RX ADMIN — Medication 125 MICROGRAM(S): at 05:53

## 2023-09-19 RX ADMIN — Medication 81 MILLIGRAM(S): at 11:48

## 2023-09-19 NOTE — PROGRESS NOTE ADULT - SUBJECTIVE AND OBJECTIVE BOX
HPI: 72yMale w/  HTN, HL, hypothyroid, hx of venous reflux s/p RLE RFA 9/21/22, large bulging varices on both LEs, early signs of venous stasis and asymptomatic spider veins. Now presenting for eval of carotid stenosis, asymptomatic. (R >70%< L <50%).  He is asymptomatic, has had no focal neurological deficits.Carotid duplex ordered to eval stenosis, shows: R ICA shadowing > stenosis, R ICA> 70% (219/56): Limited visibility. L ICA < 50%, CT showed severe stenosis of proximal R ICA, Moderate stenosis of the right vertebral, and mild stenosis of L ICA. Patient now presenting for R CEA.   pt transferred to SICU post-op uncomplicated right CEA, EBL 50, got Cryst 700. SBP 90's on arrival to SICU, pt asymptomatic, denies cp/sob/palp, denies headaches, dizziness, weakness. no med c/o.     PMH: as above.     INTERVAL/OVERNIGHT EVENTS: Passed TOV. Received Ofirmev.     SUBJECTIVE: Patient states that he is feeling well post CEA. Patient states that he had right neck pain radiating to the eye and ear overnight that has since resolved. Patient states that he would like to explore alternative pain management options for pain near his CEA incision in addition to acetaminophen.    Medications  norepinephrine Infusion 0.05 MICROgram(s)/kG/Min IV Continuous <Continuous>  dextrose 5%. 1000 milliLiter(s) IV Continuous <Continuous>  dextrose 5%. 1000 milliLiter(s) IV Continuous <Continuous>  magnesium sulfate  IVPB 2 Gram(s) IV Intermittent once  aspirin  chewable 81 milliGRAM(s) Oral daily  influenza  Vaccine (HIGH DOSE) 0.7 milliLiter(s) IntraMuscular once  atorvastatin 40 milliGRAM(s) Oral at bedtime  dextrose 50% Injectable 12.5 Gram(s) IV Push once  dextrose 50% Injectable 25 Gram(s) IV Push once  dextrose 50% Injectable 25 Gram(s) IV Push once  dextrose Oral Gel 15 Gram(s) Oral once PRN Blood Glucose LESS THAN 70 milliGRAM(s)/deciliter  glucagon  Injectable 1 milliGRAM(s) IntraMuscular once  insulin lispro (ADMELOG) corrective regimen sliding scale   SubCutaneous every 6 hours  levothyroxine 125 MICROGram(s) Oral daily  chlorhexidine 2% Cloths 1 Application(s) Topical <User Schedule>  dextrose Oral Gel 15 Gram(s) Oral once PRN Blood Glucose LESS THAN 70 milliGRAM(s)/deciliter      I&O's Detail    18 Sep 2023 07:01  -  19 Sep 2023 07:00  --------------------------------------------------------  IN:    IV PiggyBack: 100 mL    Norepinephrine: 145.6 mL    Oral Fluid: 300 mL    sodium chloride 0.9%: 2000 mL  Total IN: 2545.6 mL    OUT:    Voided (mL): 1230 mL  Total OUT: 1230 mL    Total NET: 1315.6 mL      19 Sep 2023 07:01  -  19 Sep 2023 07:47  --------------------------------------------------------  IN:    Norepinephrine: 5.4 mL  Total IN: 5.4 mL    OUT:  Total OUT: 0 mL    Total NET: 5.4 mL      Vital Signs Last 24 Hrs  T(C): 36.9 (19 Sep 2023 05:01), Max: 36.9 (18 Sep 2023 22:24)  T(F): 98.5 (19 Sep 2023 05:01), Max: 98.5 (19 Sep 2023 05:01)  HR: 50 (19 Sep 2023 07:00) (41 - 65)  BP: 119/55 (19 Sep 2023 07:00) (93/51 - 144/67)  BP(mean): 81 (19 Sep 2023 07:00) (66 - 96)  RR: 21 (19 Sep 2023 07:00) (16 - 26)  SpO2: 96% (19 Sep 2023 07:00) (85% - 99%)    Parameters below as of 19 Sep 2023 08:00  Patient On (Oxygen Delivery Method): nasal cannula  O2 Flow (L/min): 2    PHYSICAL EXAM:   Neurological: AAOx3, strength 5/5 b/l, JOSIAH  ENT: mucus membrane moist, right neck dsg clean, no bleeding.   Cardiovascular: RRR, 2+ pulses, Cap refill <2 seconds  Respiratory: Airway patent. Lung sounds clear bilaterally. No rales/wheezing/rhonchi  Gastrointestinal: soft, NT, ND, BS+  Extremities: warm, no dependent edema. 2+ pulses in all 4 extremities  Vascular: no cyanosis/erythema  Skin: warm, well perfused    LABS:                        13.3   12.04 )-----------( 204      ( 19 Sep 2023 05:07 )             39.0     09-19    135  |  106  |  10  ----------------------------<  119<H>  4.0   |  22  |  0.59    Ca    7.6<L>      19 Sep 2023 05:07  Phos  2.9     09-19  Mg     1.7     09-19    PT/INR - ( 18 Sep 2023 12:08 )   PT: 14.4 sec;   INR: 1.27       PTT - ( 18 Sep 2023 12:08 )  PTT:42.5 sec  Urinalysis Basic - ( 19 Sep 2023 05:07 )    Color: x / Appearance: x / SG: x / pH: x  Gluc: 119 mg/dL / Ketone: x  / Bili: x / Urobili: x   Blood: x / Protein: x / Nitrite: x   Leuk Esterase: x / RBC: x / WBC x   Sq Epi: x / Non Sq Epi: x / Bacteria: x        RADIOLOGY & ADDITIONAL STUDIES:     HPI: 72yMale w/  HTN, HL, hypothyroid, hx of venous reflux s/p RLE RFA 9/21/22, large bulging varices on both LEs, early signs of venous stasis and asymptomatic spider veins. Now presenting for eval of carotid stenosis, asymptomatic. (R >70%< L <50%).  He is asymptomatic, has had no focal neurological deficits.Carotid duplex ordered to eval stenosis, shows: R ICA shadowing > stenosis, R ICA> 70% (219/56): Limited visibility. L ICA < 50%, CT showed severe stenosis of proximal R ICA, Moderate stenosis of the right vertebral, and mild stenosis of L ICA. Patient now presenting for R CEA.   pt transferred to SICU post-op uncomplicated right CEA, EBL 50, got Cryst 700. SBP 90's on arrival to SICU, pt asymptomatic, denies cp/sob/palp, denies headaches, dizziness, weakness. no med c/o.     PMH: as above.   Allergies: NKA    INTERVAL/OVERNIGHT EVENTS: Passed TOV. Received Ofirmev.     SUBJECTIVE: Patient states that he is feeling well post CEA. Patient states that he had right neck pain radiating to the eye and ear overnight that has since resolved. Patient states that he would like to explore alternative pain management options for pain near his CEA incision in addition to acetaminophen.    Medications  norepinephrine Infusion 0.05 MICROgram(s)/kG/Min IV Continuous <Continuous>  dextrose 5%. 1000 milliLiter(s) IV Continuous <Continuous>  dextrose 5%. 1000 milliLiter(s) IV Continuous <Continuous>  magnesium sulfate  IVPB 2 Gram(s) IV Intermittent once  aspirin  chewable 81 milliGRAM(s) Oral daily  influenza  Vaccine (HIGH DOSE) 0.7 milliLiter(s) IntraMuscular once  atorvastatin 40 milliGRAM(s) Oral at bedtime  dextrose 50% Injectable 12.5 Gram(s) IV Push once  dextrose 50% Injectable 25 Gram(s) IV Push once  dextrose 50% Injectable 25 Gram(s) IV Push once  dextrose Oral Gel 15 Gram(s) Oral once PRN Blood Glucose LESS THAN 70 milliGRAM(s)/deciliter  glucagon  Injectable 1 milliGRAM(s) IntraMuscular once  insulin lispro (ADMELOG) corrective regimen sliding scale   SubCutaneous every 6 hours  levothyroxine 125 MICROGram(s) Oral daily  chlorhexidine 2% Cloths 1 Application(s) Topical <User Schedule>  dextrose Oral Gel 15 Gram(s) Oral once PRN Blood Glucose LESS THAN 70 milliGRAM(s)/deciliter      I&O's Detail    18 Sep 2023 07:01  -  19 Sep 2023 07:00  --------------------------------------------------------  IN:    IV PiggyBack: 100 mL    Norepinephrine: 145.6 mL    Oral Fluid: 300 mL    sodium chloride 0.9%: 2000 mL  Total IN: 2545.6 mL    OUT:    Voided (mL): 1230 mL  Total OUT: 1230 mL    Total NET: 1315.6 mL      19 Sep 2023 07:01  -  19 Sep 2023 07:47  --------------------------------------------------------  IN:    Norepinephrine: 5.4 mL  Total IN: 5.4 mL    OUT:  Total OUT: 0 mL    Total NET: 5.4 mL      Vital Signs Last 24 Hrs  T(C): 36.9 (19 Sep 2023 05:01), Max: 36.9 (18 Sep 2023 22:24)  T(F): 98.5 (19 Sep 2023 05:01), Max: 98.5 (19 Sep 2023 05:01)  HR: 50 (19 Sep 2023 07:00) (41 - 65)  BP: 119/55 (19 Sep 2023 07:00) (93/51 - 144/67)  BP(mean): 81 (19 Sep 2023 07:00) (66 - 96)  RR: 21 (19 Sep 2023 07:00) (16 - 26)  SpO2: 96% (19 Sep 2023 07:00) (85% - 99%)    Parameters below as of 19 Sep 2023 08:00  Patient On (Oxygen Delivery Method): nasal cannula  O2 Flow (L/min): 2    PHYSICAL EXAM:   Neurological: AAOx3, strength 5/5 b/l, JOSIAH  ENT: mucus membrane moist, right neck dsg clean, no bleeding.   Cardiovascular: RRR, 2+ pulses, Cap refill <2 seconds  Respiratory: Airway patent. Lung sounds clear bilaterally. No rales/wheezing/rhonchi  Gastrointestinal: soft, NT, ND, BS+  Extremities: warm, no dependent edema. 2+ pulses in all 4 extremities  Vascular: no cyanosis/erythema  Skin: warm, well perfused    LABS:                        13.3   12.04 )-----------( 204      ( 19 Sep 2023 05:07 )             39.0     09-19    135  |  106  |  10  ----------------------------<  119<H>  4.0   |  22  |  0.59    Ca    7.6<L>      19 Sep 2023 05:07  Phos  2.9     09-19  Mg     1.7     09-19    PT/INR - ( 18 Sep 2023 12:08 )   PT: 14.4 sec;   INR: 1.27       PTT - ( 18 Sep 2023 12:08 )  PTT:42.5 sec  Urinalysis Basic - ( 19 Sep 2023 05:07 )    Color: x / Appearance: x / SG: x / pH: x  Gluc: 119 mg/dL / Ketone: x  / Bili: x / Urobili: x   Blood: x / Protein: x / Nitrite: x   Leuk Esterase: x / RBC: x / WBC x   Sq Epi: x / Non Sq Epi: x / Bacteria: x        RADIOLOGY & ADDITIONAL STUDIES:     HPI: 72yMale w/  HTN, HL, hypothyroid, hx of venous reflux s/p RLE RFA 9/21/22, large bulging varices on both LEs, early signs of venous stasis and asymptomatic spider veins. Now presenting for eval of carotid stenosis, asymptomatic. (R >70%< L <50%).  He is asymptomatic, has had no focal neurological deficits.Carotid duplex ordered to eval stenosis, shows: R ICA shadowing > stenosis, R ICA> 70% (219/56): Limited visibility. L ICA < 50%, CT showed severe stenosis of proximal R ICA, Moderate stenosis of the right vertebral, and mild stenosis of L ICA. Patient now presenting for R CEA.   pt transferred to SICU post-op uncomplicated right CEA, EBL 50, got Cryst 700. SBP 90's on arrival to SICU, pt asymptomatic, denies cp/sob/palp, denies headaches, dizziness, weakness. no med c/o.     PMH: as above.   MEDS: Norvasc, lipitor, synthroid 125q5, vit D, ASA 81  Allergies: NKA    INTERVAL/OVERNIGHT EVENTS: Passed TOV. Received Ofirmev.     SUBJECTIVE: Patient states that he is feeling well post CEA. Patient states that he had right neck pain radiating to the eye and ear overnight that has since resolved. Patient states that he would like to explore alternative pain management options for pain near his CEA incision in addition to acetaminophen.    Vital Signs Last 24 Hrs  T(C): 36.9 (19 Sep 2023 05:01), Max: 36.9 (18 Sep 2023 22:24)  T(F): 98.5 (19 Sep 2023 05:01), Max: 98.5 (19 Sep 2023 05:01)  HR: 50 (19 Sep 2023 07:00) (41 - 65)  BP: 119/55 (19 Sep 2023 07:00) (93/51 - 144/67)  BP(mean): 81 (19 Sep 2023 07:00) (66 - 96)  RR: 21 (19 Sep 2023 07:00) (16 - 26)  SpO2: 96% (19 Sep 2023 07:00) (85% - 99%)    Parameters below as of 19 Sep 2023 08:00  Patient On (Oxygen Delivery Method): nasal cannula  O2 Flow (L/min): 2    I&O's Detail    18 Sep 2023 07:01  -  19 Sep 2023 07:00  --------------------------------------------------------  IN:    IV PiggyBack: 100 mL    Norepinephrine: 145.6 mL    Oral Fluid: 300 mL    sodium chloride 0.9%: 2000 mL  Total IN: 2545.6 mL    OUT:    Voided (mL): 1230 mL  Total OUT: 1230 mL    Total NET: 1315.6 mL      19 Sep 2023 07:01  -  19 Sep 2023 07:47  --------------------------------------------------------  IN:    Norepinephrine: 5.4 mL  Total IN: 5.4 mL    OUT:  Total OUT: 0 mL    Total NET: 5.4 mL      PHYSICAL EXAM:   Neurological: AAOx3, strength 5/5 b/l, JOSIAH  ENT: mucus membrane moist, right neck dsg clean, no bleeding.   Cardiovascular: RRR, 2+ pulses, Cap refill <2 seconds  Respiratory: Airway patent. Lung sounds clear bilaterally. No rales/wheezing/rhonchi  Gastrointestinal: soft, NT, ND, BS+  Extremities: warm, no dependent edema. 2+ pulses in all 4 extremities  Vascular: no cyanosis/erythema  Skin: warm, well perfused    LABS:                        13.3   12.04 )-----------( 204      ( 19 Sep 2023 05:07 )             39.0     09-19    135  |  106  |  10  ----------------------------<  119<H>  4.0   |  22  |  0.59    Ca    7.6<L>      19 Sep 2023 05:07  Phos  2.9     09-19  Mg     1.7     09-19    PT/INR - ( 18 Sep 2023 12:08 )   PT: 14.4 sec;   INR: 1.27       PTT - ( 18 Sep 2023 12:08 )  PTT:42.5 sec  Urinalysis Basic - ( 19 Sep 2023 05:07 )    Medications  norepinephrine Infusion 0.05 MICROgram(s)/kG/Min IV Continuous <Continuous>  dextrose 5%. 1000 milliLiter(s) IV Continuous <Continuous>  dextrose 5%. 1000 milliLiter(s) IV Continuous <Continuous>  magnesium sulfate  IVPB 2 Gram(s) IV Intermittent once  aspirin  chewable 81 milliGRAM(s) Oral daily  influenza  Vaccine (HIGH DOSE) 0.7 milliLiter(s) IntraMuscular once  atorvastatin 40 milliGRAM(s) Oral at bedtime  dextrose 50% Injectable 12.5 Gram(s) IV Push once  dextrose 50% Injectable 25 Gram(s) IV Push once  dextrose 50% Injectable 25 Gram(s) IV Push once  dextrose Oral Gel 15 Gram(s) Oral once PRN Blood Glucose LESS THAN 70 milliGRAM(s)/deciliter  glucagon  Injectable 1 milliGRAM(s) IntraMuscular once  insulin lispro (ADMELOG) corrective regimen sliding scale   SubCutaneous every 6 hours  levothyroxine 125 MICROGram(s) Oral daily  chlorhexidine 2% Cloths 1 Application(s) Topical <User Schedule>  dextrose Oral Gel 15 Gram(s) Oral once PRN Blood Glucose LESS THAN 70 milliGRAM(s)/deciliter    Color: x / Appearance: x / SG: x / pH: x  Gluc: 119 mg/dL / Ketone: x  / Bili: x / Urobili: x   Blood: x / Protein: x / Nitrite: x   Leuk Esterase: x / RBC: x / WBC x   Sq Epi: x / Non Sq Epi: x / Bacteria: x        RADIOLOGY & ADDITIONAL STUDIES:     HPI: 72yMale w/  HTN, HL, hypothyroid, hx of venous reflux s/p RLE RFA 9/21/22, large bulging varices on both LEs, early signs of venous stasis and asymptomatic spider veins. Now presenting for eval of carotid stenosis, asymptomatic. (R >70%< L <50%).  He is asymptomatic, has had no focal neurological deficits.Carotid duplex ordered to eval stenosis, shows: R ICA shadowing > stenosis, R ICA> 70% (219/56): Limited visibility. L ICA < 50%, CT showed severe stenosis of proximal R ICA, Moderate stenosis of the right vertebral, and mild stenosis of L ICA. Patient now presenting for R CEA.   pt transferred to SICU post-op uncomplicated right CEA, EBL 50, got Cryst 700. SBP 90's on arrival to SICU, pt asymptomatic, denies cp/sob/palp, denies headaches, dizziness, weakness. no med c/o.   PMH: as above.   MEDS: Norvasc, lipitor, synthroid 125q5, vit D, ASA 81  Allergies: NKA    INTERVAL/OVERNIGHT EVENTS: Passed TOV. Received Ofirmev.     SUBJECTIVE: Patient states that he is feeling well post CEA. Patient states that he had right neck pain radiating to the eye and ear overnight that has since resolved. Patient states that he would like to explore alternative pain management options for pain near his CEA incision in addition to acetaminophen.    Vital Signs Last 24 Hrs  T(C): 36.9 (19 Sep 2023 05:01), Max: 36.9 (18 Sep 2023 22:24)  T(F): 98.5 (19 Sep 2023 05:01), Max: 98.5 (19 Sep 2023 05:01)  HR: 50 (19 Sep 2023 07:00) (41 - 65)  BP: 119/55 (19 Sep 2023 07:00) (93/51 - 144/67)  BP(mean): 81 (19 Sep 2023 07:00) (66 - 96)  RR: 21 (19 Sep 2023 07:00) (16 - 26)  SpO2: 96% (19 Sep 2023 07:00) (85% - 99%)    Parameters below as of 19 Sep 2023 08:00  Patient On (Oxygen Delivery Method): nasal cannula  O2 Flow (L/min): 2    I&O's Detail    18 Sep 2023 07:01  -  19 Sep 2023 07:00  --------------------------------------------------------  IN:    IV PiggyBack: 100 mL    Norepinephrine: 145.6 mL    Oral Fluid: 300 mL    sodium chloride 0.9%: 2000 mL  Total IN: 2545.6 mL    OUT:    Voided (mL): 1230 mL  Total OUT: 1230 mL    Total NET: 1315.6 mL      19 Sep 2023 07:01  -  19 Sep 2023 07:47  --------------------------------------------------------  IN:    Norepinephrine: 5.4 mL  Total IN: 5.4 mL    OUT:  Total OUT: 0 mL    Total NET: 5.4 mL      PHYSICAL EXAM:   Neurological: AAOx3, strength 5/5 b/l, JOSIAH  ENT: mucus membrane moist, right neck dsg clean, no bleeding.   Cardiovascular: RRR, 2+ pulses, Cap refill <2 seconds  Respiratory: Airway patent. Lung sounds clear bilaterally. No rales/wheezing/rhonchi  Gastrointestinal: soft, NT, ND, BS+  Extremities: warm, no dependent edema. 2+ pulses in all 4 extremities  Vascular: no cyanosis/erythema  Skin: warm, well perfused    LABS:                        13.3   12.04 )-----------( 204      ( 19 Sep 2023 05:07 )             39.0     09-19    135  |  106  |  10  ----------------------------<  119<H>  4.0   |  22  |  0.59    Ca    7.6<L>      19 Sep 2023 05:07  Phos  2.9     09-19  Mg     1.7     09-19    PT/INR - ( 18 Sep 2023 12:08 )   PT: 14.4 sec;   INR: 1.27       PTT - ( 18 Sep 2023 12:08 )  PTT:42.5 sec  Urinalysis Basic - ( 19 Sep 2023 05:07 )    Medications  norepinephrine Infusion 0.05 MICROgram(s)/kG/Min IV Continuous <Continuous>  dextrose 5%. 1000 milliLiter(s) IV Continuous <Continuous>  dextrose 5%. 1000 milliLiter(s) IV Continuous <Continuous>  magnesium sulfate  IVPB 2 Gram(s) IV Intermittent once  aspirin  chewable 81 milliGRAM(s) Oral daily  influenza  Vaccine (HIGH DOSE) 0.7 milliLiter(s) IntraMuscular once  atorvastatin 40 milliGRAM(s) Oral at bedtime  dextrose 50% Injectable 12.5 Gram(s) IV Push once  dextrose 50% Injectable 25 Gram(s) IV Push once  dextrose 50% Injectable 25 Gram(s) IV Push once  dextrose Oral Gel 15 Gram(s) Oral once PRN Blood Glucose LESS THAN 70 milliGRAM(s)/deciliter  glucagon  Injectable 1 milliGRAM(s) IntraMuscular once  insulin lispro (ADMELOG) corrective regimen sliding scale   SubCutaneous every 6 hours  levothyroxine 125 MICROGram(s) Oral daily  chlorhexidine 2% Cloths 1 Application(s) Topical <User Schedule>  dextrose Oral Gel 15 Gram(s) Oral once PRN Blood Glucose LESS THAN 70 milliGRAM(s)/deciliter    Color: x / Appearance: x / SG: x / pH: x  Gluc: 119 mg/dL / Ketone: x  / Bili: x / Urobili: x   Blood: x / Protein: x / Nitrite: x   Leuk Esterase: x / RBC: x / WBC x   Sq Epi: x / Non Sq Epi: x / Bacteria: x        RADIOLOGY & ADDITIONAL STUDIES:     HPI: 72yMale w/  HTN, HL, hypothyroid, hx of venous reflux s/p RLE RFA 9/21/22, large bulging varices on both LEs, early signs of venous stasis and asymptomatic spider veins. Now presenting for eval of carotid stenosis, asymptomatic. (R >70%< L <50%).  He is asymptomatic, has had no focal neurological deficits.Carotid duplex ordered to eval stenosis, shows: R ICA shadowing > stenosis, R ICA> 70% (219/56): Limited visibility. L ICA < 50%, CT showed severe stenosis of proximal R ICA, Moderate stenosis of the right vertebral, and mild stenosis of L ICA. Patient now presenting for R CEA.   pt transferred to SICU post-op uncomplicated right CEA, EBL 50, got Cryst 700. SBP 90's on arrival to SICU, pt asymptomatic, denies cp/sob/palp, denies headaches, dizziness, weakness. no med c/o.   PMH: as above.   MEDS: Norvasc, lipitor, synthroid 125q5, vit D, ASA 81  Allergies: NKA    INTERVAL/OVERNIGHT EVENTS: Passed TOV. Received Ofirmev.     SUBJECTIVE: Patient states that he is feeling well post CEA. Patient states that he had right neck pain radiating to the eye and ear overnight that has since resolved. Patient states that he would like to explore alternative pain management options for pain near his CEA incision in addition to acetaminophen.    Vital Signs Last 24 Hrs  T(C): 36.9 (19 Sep 2023 05:01), Max: 36.9 (18 Sep 2023 22:24)  T(F): 98.5 (19 Sep 2023 05:01), Max: 98.5 (19 Sep 2023 05:01)  HR: 54 (19 Sep 2023 08:00) (41 - 54)  BP: 106/51 (19 Sep 2023 08:00) (93/51 - 144/67)  BP(mean): 73 (19 Sep 2023 08:00) (66 - 96)  RR: 21 (19 Sep 2023 08:00) (17 - 26)  SpO2: 93% (19 Sep 2023 08:00) (85% - 99%)    Parameters below as of 19 Sep 2023 08:00  Patient On (Oxygen Delivery Method): nasal cannula  O2 Flow (L/min): 2    I&O's Detail  18 Sep 2023 07:01  -  19 Sep 2023 07:00  --------------------------------------------------------  IN:    IV PiggyBack: 100 mL    Norepinephrine: 145.6 mL    Oral Fluid: 300 mL    sodium chloride 0.9%: 2000 mL  Total IN: 2545.6 mL    OUT:    Voided (mL): 1230 mL  Total OUT: 1230 mL    Total NET: 1315.6 mL      19 Sep 2023 07:01  -  19 Sep 2023 08:40  --------------------------------------------------------  IN:    IV PiggyBack: 50 mL    Norepinephrine: 5.4 mL  Total IN: 55.4 mL    OUT:    Voided (mL): 340 mL  Total OUT: 340 mL    Total NET: -284.6 mL    PHYSICAL EXAM:   Neurological: AAOx3, strength 5/5 b/l, JOSIAH  ENT: mucus membrane moist, right neck dsg clean, no bleeding.   Cardiovascular: RRR, 2+ pulses, Cap refill <2 seconds  Respiratory: Airway patent. Lung sounds clear bilaterally. No rales/wheezing/rhonchi  Gastrointestinal: soft, NT, ND, BS+  Extremities: warm, no dependent edema. 2+ pulses in all 4 extremities  Vascular: no cyanosis/erythema  Skin: warm, well perfused    LABS:               13.3   12.04 )-----------( 204      ( 19 Sep 2023 05:07 )             39.0     09-19    135  |  106  |  10  ----------------------------<  119<H>  4.0   |  22  |  0.59    Ca    7.6<L>      19 Sep 2023 05:07  Phos  2.9     09-19  Mg     1.7     09-19    PT/INR - ( 18 Sep 2023 12:08 )   PT: 14.4 sec;   INR: 1.27    PTT - ( 18 Sep 2023 12:08 )  PTT:42.5 sec  Urinalysis Basic - ( 19 Sep 2023 05:07 )    Medications:  norepinephrine Infusion 0.05 MICROgram(s)/kG/Min IV Continuous <Continuous>  dextrose 5%. 1000 milliLiter(s) IV Continuous <Continuous>  dextrose 5%. 1000 milliLiter(s) IV Continuous <Continuous>  aspirin  chewable 81 milliGRAM(s) Oral daily  influenza  Vaccine (HIGH DOSE) 0.7 milliLiter(s) IntraMuscular once  atorvastatin 40 milliGRAM(s) Oral at bedtime  dextrose 50% Injectable 25 Gram(s) IV Push once  dextrose 50% Injectable 12.5 Gram(s) IV Push once  dextrose 50% Injectable 25 Gram(s) IV Push once  dextrose Oral Gel 15 Gram(s) Oral once PRN Blood Glucose LESS THAN 70 milliGRAM(s)/deciliter  glucagon  Injectable 1 milliGRAM(s) IntraMuscular once  insulin lispro (ADMELOG) corrective regimen sliding scale   SubCutaneous every 6 hours  levothyroxine 125 MICROGram(s) Oral daily  chlorhexidine 2% Cloths 1 Application(s) Topical <User Schedule>  dextrose Oral Gel 15 Gram(s) Oral once PRN Blood Glucose LESS THAN 70 milliGRAM(s)/deciliter             HPI: 72yMale w/  HTN, HL, hypothyroid, hx of venous reflux s/p RLE RFA 9/21/22, large bulging varices on both LEs, early signs of venous stasis and asymptomatic spider veins. Now presenting for eval of carotid stenosis, asymptomatic. (R >70%< L <50%).  He is asymptomatic, has had no focal neurological deficits. Carotid duplex ordered to eval stenosis, shows: R ICA shadowing > stenosis, R ICA> 70% (219/56): Limited visibility. L ICA < 50%, CT showed severe stenosis of proximal R ICA, Moderate stenosis of the right vertebral, and mild stenosis of L ICA. Patient now presenting for R CEA.   pt transferred to SICU post-op uncomplicated right CEA, EBL 50, got Cryst 700. SBP 90's on arrival to SICU, pt asymptomatic, denies cp/sob/palp, denies headaches, dizziness, weakness. no med c/o.   PMH: as above.   MEDS: Norvasc, lipitor, synthroid 125q5, vit D, ASA 81  Allergies: NKA    INTERVAL/OVERNIGHT EVENTS: Passed TOV. Received Ofirmev.     SUBJECTIVE: Patient states that he is feeling well post CEA. Patient states that he had right neck pain radiating to the eye and ear overnight that has since resolved.     Vital Signs Last 24 Hrs  T(C): 36.9 (19 Sep 2023 05:01), Max: 36.9 (18 Sep 2023 22:24)  T(F): 98.5 (19 Sep 2023 05:01), Max: 98.5 (19 Sep 2023 05:01)  HR: 54 (19 Sep 2023 08:00) (41 - 54)  BP: 106/51 (19 Sep 2023 08:00) (93/51 - 144/67)  BP(mean): 73 (19 Sep 2023 08:00) (66 - 96)  RR: 21 (19 Sep 2023 08:00) (17 - 26)  SpO2: 93% (19 Sep 2023 08:00) (85% - 99%)    Parameters below as of 19 Sep 2023 08:00  Patient On (Oxygen Delivery Method): nasal cannula  O2 Flow (L/min): 2    I&O's Detail  18 Sep 2023 07:01  -  19 Sep 2023 07:00  --------------------------------------------------------  IN:    IV PiggyBack: 100 mL    Norepinephrine: 145.6 mL    Oral Fluid: 300 mL    sodium chloride 0.9%: 2000 mL  Total IN: 2545.6 mL    OUT:    Voided (mL): 1230 mL  Total OUT: 1230 mL    Total NET: 1315.6 mL      19 Sep 2023 07:01  -  19 Sep 2023 08:40  --------------------------------------------------------  IN:    IV PiggyBack: 50 mL    Norepinephrine: 5.4 mL  Total IN: 55.4 mL    OUT:    Voided (mL): 340 mL  Total OUT: 340 mL    Total NET: -284.6 mL    PHYSICAL EXAM:   Neurological: AAOx3, strength 5/5 b/l, JOSIAH  ENT: mucus membrane moist, right neck dsg clean, no bleeding.   Cardiovascular: RRR, 2+ pulses, Cap refill <2 seconds  Respiratory: Airway patent. Lung sounds clear bilaterally. No rales/wheezing/rhonchi  Gastrointestinal: soft, NT, ND, BS+  Extremities: warm, no dependent edema. 2+ pulses in all 4 extremities  Vascular: no cyanosis/erythema  Skin: warm, well perfused    LABS:               13.3   12.04 )-----------( 204      ( 19 Sep 2023 05:07 )             39.0     09-19    135  |  106  |  10  ----------------------------<  119<H>  4.0   |  22  |  0.59    Ca    7.6<L>      19 Sep 2023 05:07  Phos  2.9     09-19  Mg     1.7     09-19    PT/INR - ( 18 Sep 2023 12:08 )   PT: 14.4 sec;   INR: 1.27    PTT - ( 18 Sep 2023 12:08 )  PTT:42.5 sec  Urinalysis Basic - ( 19 Sep 2023 05:07 )    Medications:  dextrose 5%. 1000 milliLiter(s) IV Continuous <Continuous>  dextrose 5%. 1000 milliLiter(s) IV Continuous <Continuous>  aspirin  chewable 81 milliGRAM(s) Oral daily  influenza  Vaccine (HIGH DOSE) 0.7 milliLiter(s) IntraMuscular once  atorvastatin 40 milliGRAM(s) Oral at bedtime  dextrose 50% Injectable 25 Gram(s) IV Push once  dextrose 50% Injectable 12.5 Gram(s) IV Push once  dextrose 50% Injectable 25 Gram(s) IV Push once  dextrose Oral Gel 15 Gram(s) Oral once PRN Blood Glucose LESS THAN 70 milliGRAM(s)/deciliter  glucagon  Injectable 1 milliGRAM(s) IntraMuscular once  insulin lispro (ADMELOG) corrective regimen sliding scale   SubCutaneous every 6 hours  levothyroxine 125 MICROGram(s) Oral daily  chlorhexidine 2% Cloths 1 Application(s) Topical <User Schedule>  dextrose Oral Gel 15 Gram(s) Oral once PRN Blood Glucose LESS THAN 70 milliGRAM(s)/deciliter

## 2023-09-19 NOTE — DISCHARGE NOTE PROVIDER - CARE PROVIDER_API CALL
Zachary Silva  Vascular Surgery  [address]  [place] [address]  Phone: [phone]  Fax: [phone]  Scheduled Appointment: 09/22/2023   Zachary Silva  Vascular Surgery  [address]  [place] [address]  Phone: [phone]  Fax: [phone]  Scheduled Appointment: 09/22/2023 10:30 PM

## 2023-09-19 NOTE — PROGRESS NOTE ADULT - SUBJECTIVE AND OBJECTIVE BOX
Vascular Surgery Progress Note    STATUS POST:  CEA (carotid endarterectomy)  POST OPERATIVE DAY #: 1    SUBJECTIVE:   Patient seen and examined at bedside with team during AM rounds. No acute events noted overnight. Patient reports pain has been well controlled. Denies fevers, chills, chest pain, SOB. Denies parasthesias or weakness in any extremity. Denies significant HA.     Vital Signs Last 24 Hrs  T(C): 36.9 (19 Sep 2023 05:01), Max: 36.9 (18 Sep 2023 22:24)  T(F): 98.5 (19 Sep 2023 05:01), Max: 98.5 (19 Sep 2023 05:01)  HR: 51 (19 Sep 2023 06:00) (41 - 65)  BP: 111/54 (19 Sep 2023 06:00) (93/51 - 144/67)  BP(mean): 79 (19 Sep 2023 06:00) (66 - 96)  RR: 23 (19 Sep 2023 06:00) (16 - 26)  SpO2: 93% (19 Sep 2023 06:00) (85% - 99%)    Parameters below as of 19 Sep 2023 06:00  Patient On (Oxygen Delivery Method): nasal cannula  O2 Flow (L/min): 2      I&O's Summary    18 Sep 2023 07:01  -  19 Sep 2023 07:00  --------------------------------------------------------  IN: 2440.2 mL / OUT: 1230 mL / NET: 1210.2 mL        Physical Exam:  General Appearance: Appears well, NAD  HEENT: R neck incision noted to be CDI without drainage or erythema  Neuro: A&Ox3, no focal deficits. CN XI intact   Pulmonary: Nonlabored breathing, no respiratory distress  Cardiovascular: NSR  Abdomen: Soft, nondistended, nontender  Extremities: WWP, SCD's in place, No LE edema appreciated    LABS:                        13.3   12.04 )-----------( 204      ( 19 Sep 2023 05:07 )             39.0     09-19    135  |  106  |  10  ----------------------------<  119<H>  4.0   |  22  |  0.59    Ca    7.6<L>      19 Sep 2023 05:07  Phos  2.9     09-19  Mg     1.7     09-19      PT/INR - ( 18 Sep 2023 12:08 )   PT: 14.4 sec;   INR: 1.27          PTT - ( 18 Sep 2023 12:08 )  PTT:42.5 sec  Urinalysis Basic - ( 19 Sep 2023 05:07 )    Color: x / Appearance: x / SG: x / pH: x  Gluc: 119 mg/dL / Ketone: x  / Bili: x / Urobili: x   Blood: x / Protein: x / Nitrite: x   Leuk Esterase: x / RBC: x / WBC x   Sq Epi: x / Non Sq Epi: x / Bacteria: x

## 2023-09-19 NOTE — PROGRESS NOTE ADULT - ASSESSMENT
A/P: 72yMale w/ hx HTN, HL, hypothyroid,  venous reflux s/p RLE RFA 9/21/22, large bulging varices on both LEs, early signs of venous stasis and asymptomatic spider veins. found asyptomatic carotid stenosis, right>L, here for elective right CEA    NEURO: no focal neuro deficits, tylenol PRN, avoid narcotics. neuro checks q1h,   CV: goal -160, BP is at goal. given 250cc bolus and started on levophed. mild relative hypotension likely related to manipulation of carotid area from CEA. already took home norvasc this morning pre-op, held additional norvasc for now given hypotension. Resume norvasc pending BP remains at goal. cont ASA 81 and lipitor.   PULM: no resp distress on nasal canula.   GI/FEN: adv to CLD. NS@100.   : Passed TOV.   ENDO: ISS, home synthroid.   ID: Increasing WBCs. Monitor for infection. ancef x 2 more doses post-op. (9/18)   PPX: SCDs, hold SQH today.   LINES: 2 PIVs, right radial madeline (9/18--)   WOUNDS/DRAINS: right neck incision.   Activity: bedrest today.   DIspo: Home A/P: 72yMale w/ hx HTN, HL, hypothyroid,  venous reflux s/p RLE RFA 9/21/22, large bulging varices on both LEs, early signs of venous stasis and asymptomatic spider veins. found asyptomatic carotid stenosis, right>L, here for elective right CEA    NEURO: no focal neuro deficits, tylenol PRN, avoid narcotics. neuro checks q1h,   CV: goal -160, BP is at goal. given 250cc bolus and started on levophed. mild relative hypotension likely related to manipulation of carotid area from CEA. already took home norvasc this morning pre-op, held additional norvasc for now given hypotension. Resume norvasc pending BP remains at goal. cont ASA 81 and lipitor.   PULM: no resp distress on nasal canula.   GI/FEN: adv to CLD. NS@100.   : Passed TOV.   ENDO: ISS, home synthroid.   ID: Increasing WBCs. Monitor for infection. ancef x 2 more doses post-op. (9/18)   PPX: SCDs, hold SQH today.   LINES: 2 PIVs, right radial madeline (9/18--)   WOUNDS/DRAINS: right neck incision.   Activity: bedrest today.   DIspo: Step down A/P: 72yMale w/ hx HTN, HL, hypothyroid,  venous reflux s/p RLE RFA 9/21/22, large bulging varices on both LEs, early signs of venous stasis and asymptomatic spider veins. found asyptomatic carotid stenosis, right>L, here for elective right CEA    NEURO: no focal neuro deficits, tylenol PRN, avoid narcotics. neuro checks q1h,   CV: goal -160, BP not at goal. given 250cc bolus and started on levophed. mild relative hypotension likely related to manipulation of carotid area from CEA. already took home norvasc this morning pre-op, held additional norvasc for now given hypotension. Hold norvasc and continue to monitor BP. Monitor bradycardia. cont ASA 81 and lipitor.   PULM: no resp distress on nasal canula.   GI/FEN: adv to CLD. NS@100.   : Passed TOV.   ENDO: ISS, home synthroid.   ID: Increasing WBCs. Monitor for infection. ancef x 2 more doses post-op. (9/18)   PPX: SCDs, hold SQH today.   LINES: 2 PIVs, right radial madeline (9/18--)   WOUNDS/DRAINS: right neck incision.   Activity: bedrest today.   DIspo: Step down A/P: 72yMale w/ hx HTN, HL, hypothyroid,  venous reflux s/p RLE RFA 9/21/22, large bulging varices on both LEs, early signs of venous stasis and asymptomatic spider veins. found asyptomatic carotid stenosis, right>L, here for elective right CEA    NEURO: no focal neuro deficits, tylenol PRN, avoid narcotics. neuro checks q1h,   CV: goal -160, BP not at goal. given 250cc bolus and started on levophed. mild relative hypotension likely related to manipulation of carotid area from CEA. already took home norvasc this morning pre-op, held additional norvasc for now given hypotension. Hold norvasc and continue to monitor BP. Monitor bradycardia. cont ASA 81 and lipitor.   PULM: no resp distress on nasal canula.   GI/FEN: adv to CLD. NS@100.   : Passed TOV.   ENDO: ISS, home synthroid.   ID: Increasing WBCs. Monitor for infection. ancef x 2 more doses post-op. (9/18)   PPX: SCDs, hold SQH today.   LINES: 2 PIVs, right radial madeline (9/18--)   WOUNDS/DRAINS: right neck incision.   Activity: bedrest today.   DIspo: SICU A/P: 72yMale w/ hx HTN, HL, hypothyroid,  venous reflux s/p RLE RFA 9/21/22, large bulging varices on both LEs, early signs of venous stasis and asymptomatic spider veins. found asyptomatic carotid stenosis, right>L, here for elective right CEA    NEURO: no focal neuro deficits, tylenol PRN, avoid narcotics. neuro checks q1h,   CV: goal -160. given 250cc bolus and started on levophed. mild relative hypotension likely related to manipulation of carotid area from CEA. already took home norvasc this morning pre-op, held additional norvasc for now given hypotension. cont ASA 81 and lipitor.   PULM: no resp distress on nasal canula.   GI/FEN: adv to CLD. NS@100.   : Passed TOV.   ENDO: ISS, home synthroid.   ID: Ancef x 2 more doses post-op. (9/18)   PPX: SCDs, hold SQH today.   LINES: 2 PIVs, right radial madeline (9/18--)   WOUNDS/DRAINS: right neck incision.   Activity: bedrest today.   DIspo: Discharge home A/P: 72yMale w/ hx HTN, HL, hypothyroid,  venous reflux s/p RLE RFA 9/21/22, large bulging varices on both LEs, early signs of venous stasis and asymptomatic spider veins. found asyptomatic carotid stenosis, right>L, here for elective right CEA    NEURO: no focal neuro deficits, tylenol PRN, avoid narcotics. neuro checks q1h,   CV: goal -160. was on levophed overnight, off levo this morning, SBP>110's when off pressors, held additional norvasc for now,  cont ASA 81 and lipitor.   PULM: no resp distress on room air,   GI/FEN: adv to regular diet,   : Passed TOV.   ENDO: ISS, home synthroid.   ID: Ancef x 2 more doses post-op. (9/18)   PPX: SCDs, hold SQH today.   LINES: 2 PIVs, right radial madeline (9/18--)   WOUNDS/DRAINS: right neck incision.   Activity: bedrest today.   DIspo: Discharge home A/P: 72yMale w/ hx HTN, HL, hypothyroid,  venous reflux s/p RLE RFA 9/21/22, large bulging varices on both LEs, early signs of venous stasis and asymptomatic spider veins. found asyptomatic carotid stenosis, right>L, here for elective right CEA    NEURO: no focal neuro deficits, tylenol PRN, avoid narcotics. neuro checks q1h,   CV: goal -160. was on levophed overnight, off levo this morning, SBP>110's when off pressors, held additional norvasc for now,  cont ASA 81 and lipitor.   PULM: no resp distress on room air,   GI/FEN: adv to regular diet,   : voids.   ENDO: ISS, home synthroid.   ID: Ancef x 2 more doses post-op. (9/18)   PPX: SCDs,  LINES: 2 PIVs, right radial madeline (9/18--)   WOUNDS/DRAINS: right neck incision.   Activity: OOB to chair  DIspo: Discharge home today

## 2023-09-19 NOTE — DISCHARGE NOTE PROVIDER - HOSPITAL COURSE
71 y/o M w/ hx of venous reflux s/p RLE RFA 9/21/22, large bulging varices on both LEs, early signs of venous stasis and asymptomatic spider veins. Now presenting for eval of carotid stenosis, asymptomatic. (R >70%< L <50%).  He has had no focal neurological deficits. Carotid duplex ordered to eval stenosis, shows: R ICA shadowing > stenosis, R ICA> 70% (219/56): Limited visibility. L ICA < 50%, CT showed severe stenosis of proximal R ICA, Moderate stenosis of the right vertebral, and mild stenosis of L ICA. Patient presented for elective right carotid endarterectomy.   He underwent right CEA on 9/18. He tolerated the procedure well without complication and remained neurologically intact. He was monitored in the ICU overnight and required levophed for hypotension. On POD1, he was weaned off of the pressor and maintained favorable blood pressure parameters. No neurological deficits.   Today patient is feeling well, all the VS and blood work is within normal limits, the pain is well controlled on oral pain medication, tolerating diet without nausea, and ambulating independently - patient is stable and ready for discharge today.

## 2023-09-19 NOTE — DISCHARGE NOTE PROVIDER - NSDCCPCAREPLAN_GEN_ALL_CORE_FT
PRINCIPAL DISCHARGE DIAGNOSIS  Diagnosis: Carotid stenosis, asymptomatic  Assessment and Plan of Treatment:       SECONDARY DISCHARGE DIAGNOSES  Diagnosis: HLD (hyperlipidemia)  Assessment and Plan of Treatment:     Diagnosis: HTN (hypertension)  Assessment and Plan of Treatment:     Diagnosis: Venous reflux  Assessment and Plan of Treatment:     Diagnosis: History of venous disease  Assessment and Plan of Treatment:     Diagnosis: Hypothyroidism  Assessment and Plan of Treatment:     Diagnosis: History of petit-mal seizures  Assessment and Plan of Treatment:     Diagnosis: Leukocytosis  Assessment and Plan of Treatment:     Diagnosis: Hypocalcemia  Assessment and Plan of Treatment:

## 2023-09-19 NOTE — DISCHARGE NOTE PROVIDER - NSDCFUADDINST_GEN_ALL_CORE_FT
FOLLOW UP: Dr. Silva in 1 week.   Your appointment has been made for _______. Call the office at  with any questions  WOUND CARE: You may shower; soap and water over incision sites. Do not scrub. Pat dry when done.   ACTIVITY: Ambulate as tolerated, but no heavy lifting (>10lbs) or strenuous exercise.    ADDITIONAL FOLLOW UP AFTER DISCHARGE: Please follow up with your PCP.   DISCHARGE DESTINATION: home     Call 675 if you or someone you know experiences the following symptoms of stroke (can be remembered by BE FAST):  •Balance: Dizziness, loss of balance, or a sense of falling  •Eyes: Sudden double vision or blurred vision  •Face: drooping of one side of the face  •Arm: arm weakness  •Speech:  Sudden trouble talking or slurred speech, trouble understanding others  •Time: Time to call for an ambulance fast!    FOLLOW UP: Dr. Silva / Ashley NP in 1 week.   Your appointment has been made for 9/22/23 at 10:30am. Call the office at  with any questions  WOUND CARE: You may shower; soap and water over incision sites. Do not scrub. Pat dry when done.   ACTIVITY: Ambulate as tolerated, but no heavy lifting (>10lbs) or strenuous exercise.    ADDITIONAL FOLLOW UP AFTER DISCHARGE: Please follow up with your PCP.   DISCHARGE DESTINATION: home     Call 911 if you or someone you know experiences the following symptoms of stroke (can be remembered by BE FAST):  •Balance: Dizziness, loss of balance, or a sense of falling  •Eyes: Sudden double vision or blurred vision  •Face: drooping of one side of the face  •Arm: arm weakness  •Speech:  Sudden trouble talking or slurred speech, trouble understanding others  •Time: Time to call for an ambulance fast!

## 2023-09-19 NOTE — DISCHARGE NOTE PROVIDER - PROVIDER TOKENS
PROVIDER:[TOKEN:[[id number]],SCHEDULEDAPPT:[09/22/2023]] PROVIDER:[TOKEN:[[id number]],SCHEDULEDAPPT:[09/22/2023],SCHEDULEDAPPTTIME:[10:30 PM]]

## 2023-09-19 NOTE — DISCHARGE NOTE NURSING/CASE MANAGEMENT/SOCIAL WORK - PATIENT PORTAL LINK FT
You can access the FollowMyHealth Patient Portal offered by [facility] by registering at the following website: [email or web address]. By joining [facility]’s FollowMyHealth portal, you will also be able to view your health information using other applications (apps) compatible with our system.

## 2023-09-19 NOTE — DISCHARGE NOTE PROVIDER - NSDCMRMEDTOKEN_GEN_ALL_CORE_FT
amLODIPine 10 mg oral tablet: 1 orally once a day  aspirin 81 mg oral tablet: 1 orally once a day  atorvastatin 10 mg oral tablet: 1 orally once a day  levothyroxine 112 mcg (0.112 mg) oral tablet: 1 orally once a day   amLODIPine 10 mg oral tablet: 1 tablet orally once a day Resume 9/20  aspirin 81 mg oral tablet: 1 orally once a day  atorvastatin 10 mg oral tablet: 1 orally once a day  levothyroxine 112 mcg (0.112 mg) oral tablet: 1 orally once a day

## 2023-09-22 ENCOUNTER — APPOINTMENT (OUTPATIENT)
Dept: VASCULAR SURGERY | Facility: CLINIC | Age: 73
End: 2023-09-22
Payer: MEDICARE

## 2023-09-22 VITALS — DIASTOLIC BLOOD PRESSURE: 67 MMHG | SYSTOLIC BLOOD PRESSURE: 131 MMHG

## 2023-09-22 DIAGNOSIS — E83.51 HYPOCALCEMIA: ICD-10-CM

## 2023-09-22 DIAGNOSIS — E78.5 HYPERLIPIDEMIA, UNSPECIFIED: ICD-10-CM

## 2023-09-22 DIAGNOSIS — I95.81 POSTPROCEDURAL HYPOTENSION: ICD-10-CM

## 2023-09-22 DIAGNOSIS — I65.21 OCCLUSION AND STENOSIS OF RIGHT CAROTID ARTERY: ICD-10-CM

## 2023-09-22 DIAGNOSIS — Z79.890 HORMONE REPLACEMENT THERAPY: ICD-10-CM

## 2023-09-22 DIAGNOSIS — D72.829 ELEVATED WHITE BLOOD CELL COUNT, UNSPECIFIED: ICD-10-CM

## 2023-09-22 DIAGNOSIS — Z79.82 LONG TERM (CURRENT) USE OF ASPIRIN: ICD-10-CM

## 2023-09-22 DIAGNOSIS — I87.2 VENOUS INSUFFICIENCY (CHRONIC) (PERIPHERAL): ICD-10-CM

## 2023-09-22 DIAGNOSIS — E03.9 HYPOTHYROIDISM, UNSPECIFIED: ICD-10-CM

## 2023-09-22 DIAGNOSIS — I10 ESSENTIAL (PRIMARY) HYPERTENSION: ICD-10-CM

## 2023-09-22 PROBLEM — Z86.69 PERSONAL HISTORY OF OTHER DISEASES OF THE NERVOUS SYSTEM AND SENSE ORGANS: Chronic | Status: ACTIVE | Noted: 2023-09-15

## 2023-09-22 PROCEDURE — 99024 POSTOP FOLLOW-UP VISIT: CPT

## 2023-09-28 LAB — SURGICAL PATHOLOGY STUDY: SIGNIFICANT CHANGE UP

## 2023-12-13 ENCOUNTER — APPOINTMENT (OUTPATIENT)
Dept: VASCULAR SURGERY | Facility: CLINIC | Age: 73
End: 2023-12-13
Payer: MEDICARE

## 2023-12-13 VITALS
DIASTOLIC BLOOD PRESSURE: 69 MMHG | WEIGHT: 206 LBS | BODY MASS INDEX: 30.51 KG/M2 | HEIGHT: 69 IN | SYSTOLIC BLOOD PRESSURE: 132 MMHG | HEART RATE: 96 BPM

## 2023-12-13 PROCEDURE — 93880 EXTRACRANIAL BILAT STUDY: CPT

## 2023-12-13 PROCEDURE — 99213 OFFICE O/P EST LOW 20 MIN: CPT | Mod: 24

## 2023-12-18 NOTE — CONSULT LETTER
[Dear  ___] : Dear  [unfilled], [FreeTextEntry2] : [address] [place] [address] [FreeTextEntry1] : I saw Mr Juanito Perea today for follow-up after right carotid endarterectomy on September 28th, 2023. He has been doing well and denies any neurological symptoms. He remains on a daily aspirin and statin.     On exam, right neck incision is well-healed.  No focal neurological deficits. Blood pressure is 132/69, heart rate 96 b/min.  Carotid duplex demonstrates the left carotid has mild, less than 50% stenosis. The right endarterectomy site is widely patent.  Overall, I am very pleased with Mr Perea's status following right carotid endarterectomy.  I will continue to see him on a yearly basis.  My complete EMR office note is below for your records. [FreeTextEntry3] : Sincerely,   Zachary Silva M.D.  , Surgical Services [facility] Surgeon-in-Chief, [facility] Professor of Surgery, [employer] at [facility]

## 2023-12-18 NOTE — PROCEDURE
[FreeTextEntry1] : Carotid duplex ordered to eval endarterectomy site and stenosis, shows: R CEA patent. L ICA < 50% stenosis.

## 2023-12-18 NOTE — PHYSICAL EXAM
[de-identified] : WN/WD [FreeTextEntry1] : No focal neuro deficits. R neck incision healing well. [de-identified] : FROM [de-identified] : See cardiovascular section

## 2023-12-18 NOTE — ASSESSMENT
[FreeTextEntry1] : 74 y/o M w/ hx of venous reflux s/p RLE RFA 9/21/22, large bulging varices on both LEs, early signs of venous stasis and asymptomatic spider veins. Now presenting for eval of carotid stenosis, asymptomatic. (R >70%, L <50%) s/p R CEA on 9/18/23. On exam, no focal neurological deficits. R neck incision healing well. /69. HR 96.  Carotid duplex today shows R CEA patent. L ICA < 50% stenosis. No vascular intervention needed at this time. We discussed monitoring carotid stenosis for now. Pt was recommended to stay active daily, and to progressively increase walking distances. Continue ASA/Statin daily. F/u in 1 yr.

## 2023-12-18 NOTE — HISTORY OF PRESENT ILLNESS
[FreeTextEntry1] : 74 y/o M w/ PMHx of HTN, HLD, who has been seen here in the past for venous reflux and symptomatic varicose veins s/p R GSV RFA 9/21/22. He presented with asymptomatic severe R sided carotid stenosis. He is now s/p R CEA on 9/18/23. He reports feeling well and denies any major complaints. He says there is a slight pricking sensation on the area of his incision. Denies any neurological symptoms. He is on a daily aspirin and statin.  FHx: -Mother- hx of varicose veins -Father - hx of heart disease -Grandfather- hx of CVA  SHx: -Never smoker.   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

## 2024-04-10 ENCOUNTER — APPOINTMENT (OUTPATIENT)
Dept: VASCULAR SURGERY | Facility: CLINIC | Age: 74
End: 2024-04-10
Payer: MEDICARE

## 2024-04-10 VITALS
WEIGHT: 206 LBS | HEART RATE: 91 BPM | HEIGHT: 69 IN | BODY MASS INDEX: 30.51 KG/M2 | DIASTOLIC BLOOD PRESSURE: 75 MMHG | SYSTOLIC BLOOD PRESSURE: 153 MMHG

## 2024-04-10 DIAGNOSIS — Z87.891 PERSONAL HISTORY OF NICOTINE DEPENDENCE: ICD-10-CM

## 2024-04-10 DIAGNOSIS — I65.23 OCCLUSION AND STENOSIS OF BILATERAL CAROTID ARTERIES: ICD-10-CM

## 2024-04-10 PROCEDURE — 93880 EXTRACRANIAL BILAT STUDY: CPT

## 2024-04-10 PROCEDURE — 99213 OFFICE O/P EST LOW 20 MIN: CPT

## 2024-04-15 RX ORDER — ROSUVASTATIN CALCIUM 20 MG/1
20 TABLET, FILM COATED ORAL
Refills: 0 | Status: ACTIVE | COMMUNITY

## 2024-04-15 RX ORDER — ATORVASTATIN CALCIUM 10 MG/1
10 TABLET, FILM COATED ORAL
Refills: 0 | Status: DISCONTINUED | COMMUNITY
End: 2024-04-15

## 2024-04-15 RX ORDER — OLMESARTAN MEDOXOMIL 20 MG/1
20 TABLET, FILM COATED ORAL
Refills: 0 | Status: ACTIVE | COMMUNITY

## 2024-04-15 RX ORDER — LEVOTHYROXINE SODIUM 0.11 MG/1
112 TABLET ORAL
Refills: 0 | Status: DISCONTINUED | COMMUNITY
End: 2024-04-15

## 2024-04-15 RX ORDER — AMLODIPINE BESYLATE 10 MG/1
10 TABLET ORAL
Refills: 0 | Status: DISCONTINUED | COMMUNITY
End: 2024-04-15

## 2024-04-17 NOTE — ASSESSMENT
[Arterial/Venous Disease] : arterial/venous disease [Medication Management] : medication management [FreeTextEntry1] : 72 y/o M w/ hx of venous reflux s/p RLE RFA 9/21/22, large bulging varices on both LEs, early signs of venous stasis and asymptomatic spider veins. Also, carotid stenosis, asymptomatic (R >70%, L <50%) s/p R CEA on 9/18/23. On exam, no focal neurological deficits. R neck incision healing well. /69. HR 96.  Carotid duplex today shows R CEA patent. L ICA < 50% stenosis. Pt was recommended to stay active daily, and to progressively increase walking distances. Continue baby ASA/Statin daily. F/u in 1 yr.

## 2024-04-17 NOTE — HISTORY OF PRESENT ILLNESS
[FreeTextEntry1] : 74 y/o M w/ PMHx of HTN, HLD, who has been seen here in the past for venous reflux and symptomatic varicose veins s/p R GSV RFA 9/21/22. He presented with asymptomatic severe R sided carotid stenosis. He is now s/p R CEA on 9/18/23. He reports feeling well and denies any major complaints. He says there is a slight pricking sensation on the area of his incision. Denies any neurological symptoms. He is on a daily baby aspirin and statin. Since Sept 2023, he suffered a family loss which took a toll on his overall activity level however, he explains he has started being more active and eating healthier.  He is seeing a new cardiologist in [place] who switched his statin/BP medication to Rosuvastatin and Olmesartan.    FHx: -Mother- hx of varicose veins -Father - hx of heart disease -Grandfather- hx of CVA  SHx: -Never smoker. Vape smoker.   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

## 2024-04-17 NOTE — PHYSICAL EXAM
[Respiratory Effort] : normal respiratory effort [No Rash or Lesion] : No rash or lesion [Alert] : alert [Oriented to Person] : oriented to person [Oriented to Place] : oriented to place [Oriented to Time] : oriented to time [Calm] : calm [2+] : left 2+ [de-identified] : WN/WD [FreeTextEntry1] : No focal neuro deficits. R neck incision healing well. [de-identified] : FROM [de-identified] : See cardiovascular section

## 2024-04-17 NOTE — CONSULT LETTER
[Dear  ___] : Dear  [unfilled], [FreeTextEntry2] : Antwon Castorena MD [address] [place] [address] [address] [place] [address]  [FreeTextEntry1] : I saw Corby Perea for follow-up.  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  As you know, on September 28th, 2023 I performed a right carotid endarterectomy in the local anesthesia.  He made an uncomplicated recovery.  He remains on a daily aspirin and statin.  On exam, right neck incision is healing well. No focal neurological deficits. Blood pressure is 153/75, heart rate 91 b/min.  Carotid duplex demonstrates the left carotid has mild, less than 50% stenosis. The right endarterectomy site is widely patent.  I am pleased with Corby's vascular status following right carotid endarterectomy. I encouraged him to stay active and walk on a daily basis. He should continue the antiplatelet and statin.  He can see me on an annual basis.     My complete EMR office note is below for your records. [FreeTextEntry3] : Sincerely,   Zachary Silva M.D.  , Surgical Services [facility] Surgeon-in-Chief, [facility] Professor of Surgery, [employer] at [facility]

## 2024-09-24 NOTE — ADDENDUM
[FreeTextEntry1] : I, Dr. Zachary Silva, personally performed the evaluation and management (E/M) services for this established patient who presents today with (a) new problem(s)/exacerbation of (an) existing condition(s).  That E/M includes conducting the examination, assessing all new/exacerbated conditions, and establishing a new plan of care.  Today, my ACP, Ashley Gupta NP, was here to observe my evaluation and management services for this new problem/exacerbated condition to be followed going forward.

## 2024-09-24 NOTE — HISTORY OF PRESENT ILLNESS
[FreeTextEntry1] : 72 y/o M w/ PMHx of HTN, HLD, who has been seen here in the past for venous reflux and symptomatic varicose veins s/p R GSV RFA 9/21/22. He presented with asymptomatic severe R sided carotid stenosis. He is now s/p R CEA on 9/18/23. He reports feeling well and denies any major complaints. Denies any neurological symptoms. He is on a daily baby aspirin and statin. Since Sept 2023, he suffered a family loss which took a toll on his overall activity level however, he explains he has started being more active and eating healthier.  He is seeing a new cardiologist in [place] who switched his statin/BP medication to Rosuvastatin and Olmesartan.    FHx: -Mother- hx of varicose veins -Father - hx of heart disease -Grandfather- hx of CVA  SHx: -Never smoker. Vape smoker.   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

## 2024-09-24 NOTE — ASSESSMENT
[FreeTextEntry1] : 72 y/o M w/ hx of venous reflux s/p RLE RFA 9/21/22, large bulging varices on both LEs, early signs of venous stasis and asymptomatic spider veins. Also, carotid stenosis, asymptomatic (R >70%, L <50%) s/p R CEA on 9/18/23. On exam, no focal neurological deficits. R neck incision healing well. BP____. HR _____.  Carotid duplex today shows R CEA patent. L ICA < 50% stenosis. Pt was recommended to stay active daily, and to progressively increase walking distances. Continue baby ASA/Statin daily. F/u in 1 yr. [Arterial/Venous Disease] : arterial/venous disease [Medication Management] : medication management

## 2024-09-24 NOTE — PHYSICAL EXAM
[Respiratory Effort] : normal respiratory effort [2+] : left 2+ [No Rash or Lesion] : No rash or lesion [Alert] : alert [Oriented to Person] : oriented to person [Oriented to Place] : oriented to place [Oriented to Time] : oriented to time [Calm] : calm [de-identified] : WN/WD [FreeTextEntry1] : No focal neuro deficits. R neck incision healing well. [de-identified] : FROM [de-identified] : See cardiovascular section

## 2024-09-25 ENCOUNTER — APPOINTMENT (OUTPATIENT)
Dept: VASCULAR SURGERY | Facility: CLINIC | Age: 74
End: 2024-09-25

## 2024-11-04 NOTE — PHYSICAL EXAM
Utilizes 2 L of nasal cannula supplemental O2 at baseline  Currently requiring 2-3 L in the setting of sepsis secondary to pneumonia  Respiratory protocol  Wean O2 as tolerated   [Respiratory Effort] : normal respiratory effort [Ankle Swelling (On Exam)] : present [Ankle Swelling Bilaterally] : bilaterally  [Varicose Veins Of Lower Extremities] : bilaterally [Ankle Swelling On The Left] : moderate [] : of the right leg [Ankle Swelling On The Right] : mild [No Rash or Lesion] : No rash or lesion [Alert] : alert [Oriented to Person] : oriented to person [Oriented to Place] : oriented to place [Oriented to Time] : oriented to time [Calm] : calm [de-identified] : WN/WD [FreeTextEntry1] : Right medial calf with large, multiple bulging varicose veins. Left medial calf with a couple of large varices. Bilateral mild ankle swelling with indentation on skin from socks. R medial ankle with collection of spider veins and some skin hyperpigmentation skin changes. [de-identified] : FROM

## 2024-11-13 ENCOUNTER — APPOINTMENT (OUTPATIENT)
Dept: VASCULAR SURGERY | Facility: CLINIC | Age: 74
End: 2024-11-13
Payer: MEDICARE

## 2024-11-13 VITALS
BODY MASS INDEX: 30.21 KG/M2 | DIASTOLIC BLOOD PRESSURE: 66 MMHG | HEIGHT: 69 IN | HEART RATE: 93 BPM | WEIGHT: 204 LBS | SYSTOLIC BLOOD PRESSURE: 144 MMHG

## 2024-11-13 DIAGNOSIS — Z87.891 PERSONAL HISTORY OF NICOTINE DEPENDENCE: ICD-10-CM

## 2024-11-13 PROCEDURE — 93880 EXTRACRANIAL BILAT STUDY: CPT

## 2024-11-13 PROCEDURE — 99213 OFFICE O/P EST LOW 20 MIN: CPT

## 2024-11-14 RX ORDER — LEVOTHYROXINE SODIUM 0.15 MG/1
150 TABLET ORAL
Refills: 0 | Status: ACTIVE | COMMUNITY

## 2024-11-14 RX ORDER — AMLODIPINE BESYLATE 5 MG/1
TABLET ORAL
Refills: 0 | Status: ACTIVE | COMMUNITY

## 2024-11-14 RX ORDER — ATORVASTATIN CALCIUM 10 MG/1
10 TABLET, FILM COATED ORAL
Refills: 0 | Status: ACTIVE | COMMUNITY

## 2025-05-14 ENCOUNTER — APPOINTMENT (OUTPATIENT)
Dept: VASCULAR SURGERY | Facility: CLINIC | Age: 75
End: 2025-05-14

## 2025-09-15 ENCOUNTER — NON-APPOINTMENT (OUTPATIENT)
Age: 75
End: 2025-09-15

## 2025-09-17 ENCOUNTER — NON-APPOINTMENT (OUTPATIENT)
Age: 75
End: 2025-09-17

## 2025-09-17 ENCOUNTER — APPOINTMENT (OUTPATIENT)
Dept: VASCULAR SURGERY | Facility: CLINIC | Age: 75
End: 2025-09-17

## 2025-09-17 DIAGNOSIS — Z87.891 PERSONAL HISTORY OF NICOTINE DEPENDENCE: ICD-10-CM

## 2025-09-17 DIAGNOSIS — I87.8 OTHER SPECIFIED DISORDERS OF VEINS: ICD-10-CM

## 2025-09-17 DIAGNOSIS — I65.23 OCCLUSION AND STENOSIS OF BILATERAL CAROTID ARTERIES: ICD-10-CM

## 2025-09-17 DIAGNOSIS — I87.2 VENOUS INSUFFICIENCY (CHRONIC) (PERIPHERAL): ICD-10-CM

## 2025-09-17 DIAGNOSIS — I83.93 ASYMPTOMATIC VARICOSE VEINS OF BILATERAL LOWER EXTREMITIES: ICD-10-CM

## (undated) DEVICE — DRAPE MAGNETIC INSTRUMENT MEDIUM

## (undated) DEVICE — CANISTER SPECIMEN CONVERTOR PLASTIC

## (undated) DEVICE — SUT ETHIBOND 2-0 18" TIES

## (undated) DEVICE — WARMING BLANKET LOWER ADULT

## (undated) DEVICE — SUT PROLENE 6-0 30" C-1

## (undated) DEVICE — SYR ASEPTO

## (undated) DEVICE — ELCTR STRYKER NEPTUNE SMOKE EVACUATION PENCIL (GREEN)

## (undated) DEVICE — SUT SILK 2-0 18" SH (POP-OFF)

## (undated) DEVICE — FRAZIER SUCTION TIP 8FR

## (undated) DEVICE — SUT PROLENE 6-0 30" BV

## (undated) DEVICE — SPONGE PEANUT AUTO COUNT

## (undated) DEVICE — SUT GORETEX CV-6 (5-0) 30" TTC-9

## (undated) DEVICE — ELCTR GROUNDING PAD ADULT COVIDIEN

## (undated) DEVICE — SUT PROLENE 7-0 18" BV

## (undated) DEVICE — NDL HYPO REGULAR BEVEL 25G X 1.5" (BLUE)

## (undated) DEVICE — GLV 7.5 PROTEXIS (WHITE)

## (undated) DEVICE — SUT PROLENE 5-0 24" C-1

## (undated) DEVICE — SUT ETHILON 3-0 30" KS

## (undated) DEVICE — STAPLER SKIN PROXIMATE

## (undated) DEVICE — SYR LUER LOK 10CC

## (undated) DEVICE — SUT GORETEX CV-6 (5-0) 30" TTC-12

## (undated) DEVICE — POSITIONER FOAM HEAD DONUT 9" (PINK)

## (undated) DEVICE — DRAPE THYROID 77" X 123"

## (undated) DEVICE — DRAPE IOBAN 13" X 13"

## (undated) DEVICE — SUT ETHIBOND 4-0 12-18"

## (undated) DEVICE — PACK VASCULAR MINOR